# Patient Record
Sex: FEMALE | Race: WHITE | NOT HISPANIC OR LATINO | Employment: FULL TIME | RURAL
[De-identification: names, ages, dates, MRNs, and addresses within clinical notes are randomized per-mention and may not be internally consistent; named-entity substitution may affect disease eponyms.]

---

## 2017-06-03 ENCOUNTER — OFFICE VISIT (OUTPATIENT)
Dept: RETAIL CLINIC | Facility: CLINIC | Age: 37
End: 2017-06-03

## 2017-06-03 VITALS
HEART RATE: 99 BPM | TEMPERATURE: 97.9 F | RESPIRATION RATE: 20 BRPM | OXYGEN SATURATION: 100 % | BODY MASS INDEX: 28.04 KG/M2 | WEIGHT: 184.4 LBS

## 2017-06-03 DIAGNOSIS — J01.00 ACUTE MAXILLARY SINUSITIS, RECURRENCE NOT SPECIFIED: Primary | ICD-10-CM

## 2017-06-03 PROCEDURE — 99213 OFFICE O/P EST LOW 20 MIN: CPT | Performed by: NURSE PRACTITIONER

## 2017-06-03 RX ORDER — AMOXICILLIN 875 MG/1
875 TABLET, COATED ORAL 2 TIMES DAILY
Qty: 20 TABLET | Refills: 0 | Status: SHIPPED | OUTPATIENT
Start: 2017-06-03 | End: 2017-06-13

## 2017-06-03 RX ORDER — CETIRIZINE HYDROCHLORIDE 10 MG/1
10 TABLET ORAL DAILY
COMMUNITY
End: 2019-07-05

## 2017-06-03 NOTE — PATIENT INSTRUCTIONS

## 2017-06-03 NOTE — PROGRESS NOTES
Subjective   Marlyn Emmanuel is a 36 y.o. female.   Chief Complaint   Patient presents with   • Sinusitis      Sinusitis   The current episode started 1 to 4 weeks ago (2 weeks). There has been no fever. Associated symptoms include congestion (head), coughing (non-productive), headaches, sinus pressure and a sore throat (mild). Pertinent negatives include no chills or shortness of breath. Past treatments include acetaminophen. The treatment provided mild relief.            Marlyn presents to [facility] with cc of sinus pressure and drainage for 1-2 weeks and has taken Tylenol Sinus Congestion, which helps for a little while and then her headache returns.  Reviewed PMFSH.  See ROS.        The following portions of the patient's history were reviewed and updated as appropriate: allergies, current medications, past family history, past medical history, past social history, past surgical history and problem list.    Review of Systems   Constitutional: Negative for chills, fatigue and fever.   HENT: Positive for congestion (head), sinus pressure and sore throat (mild).    Respiratory: Positive for cough (non-productive). Negative for chest tightness and shortness of breath.    Cardiovascular: Negative for chest pain.   Endocrine: Negative for cold intolerance.   Skin: Negative for pallor and rash.   Neurological: Positive for headaches.     Pulse 99  Temp 97.9 °F (36.6 °C) (Temporal Artery )   Resp 20  Wt 184 lb 6.4 oz (83.6 kg)  LMP 04/12/2017 Comment: irregular periods  SpO2 100%  BMI 28.04 kg/m2    Objective     Current Outpatient Prescriptions:   •  BUPROPION HBR ER PO, Take  by mouth., Disp: , Rfl:   •  cetirizine (zyrTEC) 10 MG tablet, Take 10 mg by mouth Daily., Disp: , Rfl:   •  LEVOTHYROXINE SODIUM PO, Take  by mouth., Disp: , Rfl:   •  spironolactone (ALDACTONE) 25 MG tablet, Take 25 mg by mouth Daily., Disp: , Rfl:   •  amoxicillin (AMOXIL) 875 MG tablet, Take 1 tablet by mouth 2 (Two) Times a Day for 10  days., Disp: 20 tablet, Rfl: 0  •  guaiFENesin (MUCINEX) 600 MG 12 hr tablet, Take 1,200 mg by mouth 2 (Two) Times a Day., Disp: , Rfl:   •  neomycin-polymyxin-hydrocortisone (CORTISPORIN) 1 % solution otic solution, 4 (Four) Times a Day., Disp: , Rfl:   •  Norethindrone-Eth Estradiol (NORTREL 0.5/35, 28, PO), Take  by mouth., Disp: , Rfl:   •  Phenylephrine-Ibuprofen (ADVIL SINUS CONGESTION & PAIN)  MG tablet, Take  by mouth., Disp: , Rfl:   Allergies   Allergen Reactions   • Glucophage [Metformin] Swelling       Physical Exam   Constitutional: She is oriented to person, place, and time. She appears well-developed and well-nourished. No distress.   HENT:   Head: Normocephalic.   Right Ear: Tympanic membrane and external ear normal.   Left Ear: Tympanic membrane and external ear normal.   Nose: Mucosal edema present. Right sinus exhibits maxillary sinus tenderness and frontal sinus tenderness. Left sinus exhibits maxillary sinus tenderness and frontal sinus tenderness.   Mouth/Throat: Uvula is midline and mucous membranes are normal. Posterior oropharyngeal erythema present. Tonsils are 2+ on the right. Tonsils are 2+ on the left. No tonsillar exudate.   Eyes: Conjunctivae and EOM are normal. Pupils are equal, round, and reactive to light.   Neck: Normal range of motion. Neck supple.   Cardiovascular: Normal rate, regular rhythm and normal heart sounds.    Pulmonary/Chest: Effort normal and breath sounds normal. No respiratory distress.   Abdominal: Soft. Bowel sounds are normal. She exhibits no distension. There is no tenderness.   Musculoskeletal: Normal range of motion.   Lymphadenopathy:     She has no cervical adenopathy.   Neurological: She is alert and oriented to person, place, and time.   Skin: Skin is warm and dry. No rash noted.   Psychiatric: She has a normal mood and affect. Her behavior is normal. Judgment and thought content normal.   Nursing note and vitals reviewed.      Assessment/Plan    Marlyn was seen today for sinusitis.    Diagnoses and all orders for this visit:    Acute maxillary sinusitis, recurrence not specified  -     amoxicillin (AMOXIL) 875 MG tablet; Take 1 tablet by mouth 2 (Two) Times a Day for 10 days.

## 2017-08-25 ENCOUNTER — OFFICE VISIT (OUTPATIENT)
Dept: RETAIL CLINIC | Facility: CLINIC | Age: 37
End: 2017-08-25

## 2017-08-25 VITALS
WEIGHT: 177.8 LBS | BODY MASS INDEX: 27.03 KG/M2 | HEART RATE: 92 BPM | OXYGEN SATURATION: 99 % | TEMPERATURE: 97.1 F | RESPIRATION RATE: 20 BRPM

## 2017-08-25 DIAGNOSIS — R51.9 HEADACHE, UNSPECIFIED HEADACHE TYPE: ICD-10-CM

## 2017-08-25 DIAGNOSIS — R11.2 NON-INTRACTABLE VOMITING WITH NAUSEA, UNSPECIFIED VOMITING TYPE: Primary | ICD-10-CM

## 2017-08-25 PROCEDURE — 99213 OFFICE O/P EST LOW 20 MIN: CPT | Performed by: NURSE PRACTITIONER

## 2017-08-25 PROCEDURE — 96372 THER/PROPH/DIAG INJ SC/IM: CPT | Performed by: NURSE PRACTITIONER

## 2017-08-25 RX ORDER — PROMETHAZINE HYDROCHLORIDE 25 MG/ML
25 INJECTION, SOLUTION INTRAMUSCULAR; INTRAVENOUS ONCE
Status: COMPLETED | OUTPATIENT
Start: 2017-08-25 | End: 2017-08-25

## 2017-08-25 RX ADMIN — PROMETHAZINE HYDROCHLORIDE 25 MG: 25 INJECTION, SOLUTION INTRAMUSCULAR; INTRAVENOUS at 16:15

## 2017-08-25 NOTE — PROGRESS NOTES
Subjective   Marlyn Emmanuel is a 36 y.o. female.   Chief Complaint   Patient presents with   • Headache      Headache    This is a new problem. The current episode started today. The problem has been gradually worsening. The pain is located in the left unilateral and temporal region. The pain quality is similar to prior headaches. The quality of the pain is described as dull and stabbing. The pain is at a severity of 4/10. Associated symptoms include nausea, photophobia and vomiting (vomited on her way to the clinic). Pertinent negatives include no coughing, fever, rhinorrhea, sinus pressure or sore throat. The symptoms are aggravated by bright light and activity. She has tried nothing for the symptoms. (Has hx she says of headaches (migraines))      Marlyn presents to [facility] accompanied by her mother with cc of dull/stabbing headache today and says she had been a little nauseated and vomited on her way in to the clinic, she denies any fever/chilling/recent illness and describes her headache as left-sided and hurts when there is bright lights.  Reviewed PMFSH.  See ROS.          The following portions of the patient's history were reviewed and updated as appropriate: allergies, current medications, past family history, past medical history, past social history, past surgical history and problem list.    Review of Systems   Constitutional: Negative for chills and fever.   HENT: Negative for rhinorrhea, sinus pressure and sore throat.    Eyes: Positive for photophobia.   Respiratory: Negative for cough.    Gastrointestinal: Positive for nausea and vomiting (vomited on her way to the clinic).   Neurological: Positive for headaches.     Pulse 92  Temp 97.1 °F (36.2 °C) (Temporal Artery )   Resp 20  Wt 177 lb 12.8 oz (80.6 kg)  LMP 07/31/2017  SpO2 99%  BMI 27.03 kg/m2    Objective     Current Outpatient Prescriptions:   •  BUPROPION HBR ER PO, Take  by mouth., Disp: , Rfl:   •  cetirizine (zyrTEC) 10 MG tablet,  Take 10 mg by mouth Daily., Disp: , Rfl:   •  LEVOTHYROXINE SODIUM PO, Take  by mouth., Disp: , Rfl:   •  Norethindrone-Eth Estradiol (NORTREL 0.5/35, 28, PO), Take  by mouth., Disp: , Rfl:   •  spironolactone (ALDACTONE) 25 MG tablet, Take 25 mg by mouth Daily., Disp: , Rfl:   •  guaiFENesin (MUCINEX) 600 MG 12 hr tablet, Take 1,200 mg by mouth 2 (Two) Times a Day., Disp: , Rfl:   •  neomycin-polymyxin-hydrocortisone (CORTISPORIN) 1 % solution otic solution, 4 (Four) Times a Day., Disp: , Rfl:   •  Phenylephrine-Ibuprofen (ADVIL SINUS CONGESTION & PAIN)  MG tablet, Take  by mouth., Disp: , Rfl:   No current facility-administered medications for this visit.   Allergies   Allergen Reactions   • Glucophage [Metformin] Swelling       Physical Exam   Constitutional: She is oriented to person, place, and time. She appears well-developed and well-nourished. No distress.   HENT:   Head: Normocephalic and atraumatic.   Right Ear: External ear normal.   Left Ear: External ear normal.   Nose: Nose normal.   Mouth/Throat: Oropharynx is clear and moist.   Eyes: EOM are normal. Pupils are equal, round, and reactive to light. Right eye exhibits no discharge. Left eye exhibits no discharge.   Neck: Normal range of motion. Neck supple.   Cardiovascular: Normal rate, regular rhythm and normal heart sounds.    Pulmonary/Chest: Effort normal and breath sounds normal.   Abdominal: Soft. Bowel sounds are normal. She exhibits no distension. There is no tenderness. There is no rebound and no guarding.   Musculoskeletal: Normal range of motion.   Lymphadenopathy:     She has no cervical adenopathy.   Neurological: She is alert and oriented to person, place, and time. No cranial nerve deficit. Coordination normal.   Skin: Skin is warm and dry. No rash noted.   Psychiatric: She has a normal mood and affect. Her behavior is normal. Judgment and thought content normal.       Assessment/Plan   Marlyn was seen today for  headache.    Diagnoses and all orders for this visit:    Non-intractable vomiting with nausea, unspecified vomiting type  -     promethazine (PHENERGAN) injection 25 mg; Inject 1 mL into the shoulder, thigh, or buttocks 1 (One) Time.    Headache, unspecified headache type  -     promethazine (PHENERGAN) injection 25 mg; Inject 1 mL into the shoulder, thigh, or buttocks 1 (One) Time.

## 2017-08-25 NOTE — PATIENT INSTRUCTIONS
General Headache Without Cause  A headache is pain or discomfort felt around the head or neck area. The specific cause of a headache may not be found. There are many causes and types of headaches. A few common ones are:  · Tension headaches.  · Migraine headaches.  · Cluster headaches.  · Chronic daily headaches.  HOME CARE INSTRUCTIONS   Watch your condition for any changes. Take these steps to help with your condition:  Managing Pain  · Take over-the-counter and prescription medicines only as told by your health care provider.  · Lie down in a dark, quiet room when you have a headache.  · If directed, apply ice to the head and neck area:  ¨ Put ice in a plastic bag.  ¨ Place a towel between your skin and the bag.  ¨ Leave the ice on for 20 minutes, 2-3 times per day.  · Use a heating pad or hot shower to apply heat to the head and neck area as told by your health care provider.  · Keep lights dim if bright lights bother you or make your headaches worse.  Eating and Drinking  · Eat meals on a regular schedule.  · Limit alcohol use.  · Decrease the amount of caffeine you drink, or stop drinking caffeine.  General Instructions  · Keep all follow-up visits as told by your health care provider. This is important.  · Keep a headache journal to help find out what may trigger your headaches. For example, write down:    What you eat and drink.    How much sleep you get.    Any change to your diet or medicines.  · Try massage or other relaxation techniques.  · Limit stress.  · Sit up straight, and do not tense your muscles.  · Do not use tobacco products, including cigarettes, chewing tobacco, or e-cigarettes. If you need help quitting, ask your health care provider.  · Exercise regularly as told by your health care provider.  · Sleep on a regular schedule. Get 7-9 hours of sleep, or the amount recommended by your health care provider.  SEEK MEDICAL CARE IF:   · Your symptoms are not helped by medicine.  · You have a  headache that is different from the usual headache.  · You have nausea or you vomit.  · You have a fever.  SEEK IMMEDIATE MEDICAL CARE IF:   · Your headache becomes severe.  · You have repeated vomiting.  · You have a stiff neck.  · You have a loss of vision.  · You have problems with speech.  · You have pain in the eye or ear.  · You have muscular weakness or loss of muscle control.  · You lose your balance or have trouble walking.  · You feel faint or pass out.  · You have confusion.     This information is not intended to replace advice given to you by your health care provider. Make sure you discuss any questions you have with your health care provider.     Document Released: 12/18/2006 Document Revised: 09/07/2016 Document Reviewed: 04/11/2016  [employer] Interactive Patient Education ©2017 [employer]  Nausea and Vomiting, Adult  Nausea is the feeling that you have an upset stomach or have to vomit. As nausea gets worse, it can lead to vomiting. Vomiting occurs when stomach contents are thrown up and out of the mouth. Vomiting can make you feel weak and cause you to become dehydrated. Dehydration can make you tired and thirsty, cause you to have a dry mouth, and decrease how often you urinate. Older adults and people with other diseases or a weak immune system are at higher risk for dehydration. It is important to treat your nausea and vomiting as told by your health care provider.  HOME CARE INSTRUCTIONS  Follow instructions from your health care provider about how to care for yourself at home.  Eating and Drinking  Follow these recommendations as told by your health care provider:  · Take an oral rehydration solution (ORS). This is a drink that is sold at pharmacies and retail stores.  · Drink clear fluids in small amounts as you are able. Clear fluids include water, ice chips, diluted fruit juice, and low-calorie sports drinks.  · Eat bland, easy-to-digest foods in small amounts as you are able. These foods  include bananas, applesauce, rice, lean meats, toast, and crackers.  · Avoid fluids that contain a lot of sugar or caffeine, such as energy drinks, sports drinks, and soda.  · Avoid alcohol.  · Avoid spicy or fatty foods.  General Instructions  · Drink enough fluid to keep your urine clear or pale yellow.  · Wash your hands often. If soap and water are not available, use hand .  · Make sure that all people in your household wash their hands well and often.  · Take over-the-counter and prescription medicines only as told by your health care provider.  · Rest at home while you recover.  · Watch your condition for any changes.  · Breathe slowly and deeply when you feel nauseated.  · Keep all follow-up visits as told by your health care provider. This is important.  SEEK MEDICAL CARE IF:  · You have a fever.  · You cannot keep fluids down.  · Your symptoms get worse.  · You have new symptoms.  · Your nausea does not go away after two days.  · You feel light-headed or dizzy.  · You have a headache.  · You have muscle cramps.  SEEK IMMEDIATE MEDICAL CARE IF:  · You have pain in your chest, neck, arm, or jaw.  · You feel extremely weak or you faint.  · You have persistent vomiting.  · You see blood in your vomit.  · Your vomit looks like black coffee grounds.  · You have bloody or black stools or stools that look like tar.  · You have a severe headache, a stiff neck, or both.  · You have a rash.  · You have severe pain, cramping, or bloating in your abdomen.  · You have trouble breathing or you are breathing very quickly.  · Your heart is beating very quickly.  · Your skin feels cold and clammy.  · You feel confused.  · You have pain when you urinate.  · You have signs of dehydration, such as:    Dark urine, very little urine, or no urine.    Cracked lips.    Dry mouth.    Sunken eyes.    Sleepiness.    Weakness.  These symptoms may represent a serious problem that is an emergency. Do not wait to see if the  symptoms will go away. Get medical help right away. Call your local emergency services (911 in the [place] Do not drive yourself to the hospital.     This information is not intended to replace advice given to you by your health care provider. Make sure you discuss any questions you have with your health care provider.     Document Released: 12/18/2006 Document Revised: 04/10/2017 Document Reviewed: 08/23/2016  [employer] Interactive Patient Education ©2017 [employer]

## 2017-10-12 ENCOUNTER — TRANSCRIBE ORDERS (OUTPATIENT)
Dept: ADMINISTRATIVE | Facility: HOSPITAL | Age: 37
End: 2017-10-12

## 2017-10-12 DIAGNOSIS — M54.12 RADICULOPATHY, CERVICAL REGION: Primary | ICD-10-CM

## 2017-10-16 ENCOUNTER — HOSPITAL ENCOUNTER (OUTPATIENT)
Dept: MRI IMAGING | Facility: HOSPITAL | Age: 37
Discharge: HOME | End: 2017-10-16
Admitting: PHYSICIAN ASSISTANT

## 2017-10-16 DIAGNOSIS — M54.12 RADICULOPATHY, CERVICAL REGION: ICD-10-CM

## 2017-10-16 PROCEDURE — 72141 MRI NECK SPINE W/O DYE: CPT

## 2017-10-16 PROCEDURE — 72141 MRI NECK SPINE W/O DYE: CPT | Performed by: RADIOLOGY

## 2018-01-26 ENCOUNTER — TRANSCRIBE ORDERS (OUTPATIENT)
Dept: ADMINISTRATIVE | Facility: HOSPITAL | Age: 38
End: 2018-01-26

## 2018-01-26 DIAGNOSIS — Z98.890 HISTORY OF BILATERAL BREAST REDUCTION SURGERY: Primary | ICD-10-CM

## 2018-02-20 ENCOUNTER — APPOINTMENT (OUTPATIENT)
Dept: MAMMOGRAPHY | Facility: HOSPITAL | Age: 38
End: 2018-02-20
Attending: OBSTETRICS & GYNECOLOGY

## 2018-02-27 ENCOUNTER — HOSPITAL ENCOUNTER (OUTPATIENT)
Dept: MAMMOGRAPHY | Facility: HOSPITAL | Age: 38
Discharge: HOME | End: 2018-02-27
Attending: OBSTETRICS & GYNECOLOGY | Admitting: OBSTETRICS & GYNECOLOGY

## 2018-02-27 DIAGNOSIS — Z98.890 HISTORY OF BILATERAL BREAST REDUCTION SURGERY: ICD-10-CM

## 2018-02-27 PROCEDURE — 77066 DX MAMMO INCL CAD BI: CPT | Performed by: RADIOLOGY

## 2018-02-27 PROCEDURE — 77066 DX MAMMO INCL CAD BI: CPT

## 2018-02-27 PROCEDURE — G0279 TOMOSYNTHESIS, MAMMO: HCPCS

## 2018-02-27 PROCEDURE — 77062 BREAST TOMOSYNTHESIS BI: CPT | Performed by: RADIOLOGY

## 2019-03-15 ENCOUNTER — OFFICE VISIT (OUTPATIENT)
Dept: UROLOGY | Facility: CLINIC | Age: 39
End: 2019-03-15

## 2019-03-15 ENCOUNTER — HOSPITAL ENCOUNTER (OUTPATIENT)
Dept: GENERAL RADIOLOGY | Facility: HOSPITAL | Age: 39
Discharge: HOME | End: 2019-03-15
Admitting: UROLOGY

## 2019-03-15 VITALS — HEIGHT: 68 IN | BODY MASS INDEX: 27.28 KG/M2 | WEIGHT: 180 LBS

## 2019-03-15 DIAGNOSIS — N20.0 KIDNEY STONE: ICD-10-CM

## 2019-03-15 DIAGNOSIS — N20.0 KIDNEY STONE: Primary | ICD-10-CM

## 2019-03-15 PROCEDURE — 74018 RADEX ABDOMEN 1 VIEW: CPT | Performed by: RADIOLOGY

## 2019-03-15 PROCEDURE — 74018 RADEX ABDOMEN 1 VIEW: CPT

## 2019-03-15 PROCEDURE — 99203 OFFICE O/P NEW LOW 30 MIN: CPT | Performed by: UROLOGY

## 2019-03-15 RX ORDER — MONTELUKAST SODIUM 10 MG/1
10 TABLET ORAL NIGHTLY
COMMUNITY
End: 2022-12-13 | Stop reason: HOSPADM

## 2019-03-15 RX ORDER — OMEPRAZOLE 40 MG/1
40 CAPSULE, DELAYED RELEASE ORAL DAILY
COMMUNITY

## 2019-03-15 NOTE — PROGRESS NOTES
Answers for HPI/ROS submitted by the patient on 3/14/2019   Abdominal pain  Chronicity: recurrent  Onset: 1 to 4 weeks ago  Onset quality: sudden  Frequency: 2 to 4 times per day  Episode duration: 1 hours  Progression since onset: unchanged  Pain location: LLQ, RLQ, left flank, right flank  Pain - numeric: 6/10  Pain quality: dull, sharp  Radiates to: back, pelvis  anorexia: No  belching: Yes  Aggravated by: nothing  Relieved by: nothing  Diagnostic workup: CT scan, upper endoscopy  Chief Complaint:          Chief Complaint   Patient presents with   • Nephrolithiasis     Er follow up       HPI:   38 y.o. female.  38-year-old white female is referred with a stone she was seen in the emergency room 3 weeks ago.  She had pain in the right side.  Her KUB was obtained was negative.  10 years ago she had lithotripsy.  I believe she is passed her stone I gave her reassurance I will see her back on an as-needed basis    Past Medical History:        Past Medical History:   Diagnosis Date   • Anxiety    • Asthma    • Disease of thyroid gland    • Kidney stone    • PCOS (polycystic ovarian syndrome)    • PCOS (polycystic ovarian syndrome)          Current Meds:     Current Outpatient Medications   Medication Sig Dispense Refill   • ALLERGY SERUM INJECTION Inject  under the skin into the appropriate area as directed 1 (One) Time.     • LEVOTHYROXINE SODIUM PO Take 125 mcg by mouth Daily.     • montelukast (SINGULAIR) 10 MG tablet Take 10 mg by mouth Every Night.     • Multiple Vitamins-Minerals (MULTIVITAMIN ADULT PO) Take 1 tablet by mouth Daily.     • omeprazole (priLOSEC) 40 MG capsule Take 40 mg by mouth Daily.     • Semaglutide (OZEMPIC) 1 MG/DOSE solution pen-injector Inject 1 dose as directed 1 (One) Time Per Week.     • spironolactone (ALDACTONE) 25 MG tablet Take 50 mg by mouth Daily.     • BUPROPION HBR ER PO Take  by mouth.     • cetirizine (zyrTEC) 10 MG tablet Take 10 mg by mouth Daily.     • guaiFENesin (MUCINEX)  600 MG 12 hr tablet Take 1,200 mg by mouth 2 (Two) Times a Day.     • neomycin-polymyxin-hydrocortisone (CORTISPORIN) 1 % solution otic solution 4 (Four) Times a Day.     • Norethindrone-Eth Estradiol (NORTREL 0.5/35, 28, PO) Take  by mouth.     • Phenylephrine-Ibuprofen (ADVIL SINUS CONGESTION & PAIN)  MG tablet Take  by mouth.       No current facility-administered medications for this visit.         Allergies:      Allergies   Allergen Reactions   • Glucophage [Metformin] Swelling        Past Surgical History:     Past Surgical History:   Procedure Laterality Date   • BILATERAL BREAST REDUCTION     • REDUCTION MAMMAPLASTY     • VAGINAL DELIVERY  ;     2 daughters         Social History:     Social History     Socioeconomic History   • Marital status:      Spouse name: Not on file   • Number of children: Not on file   • Years of education: Not on file   • Highest education level: Not on file   Social Needs   • Financial resource strain: Not on file   • Food insecurity - worry: Not on file   • Food insecurity - inability: Not on file   • Transportation needs - medical: Not on file   • Transportation needs - non-medical: Not on file   Occupational History   • Not on file   Tobacco Use   • Smoking status: Former Smoker     Packs/day: 1.00     Years: 2.00     Pack years: 2.00     Types: Cigarettes     Last attempt to quit: 2006     Years since quittin.2   • Smokeless tobacco: Never Used   Substance and Sexual Activity   • Alcohol use: No   • Drug use: No   • Sexual activity: Yes     Birth control/protection: Pill, OCP     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  Negative for activity change, appetite change, chills, diaphoresis, fatigue and unexpected weight change.   HENT: Negative for congestion, dental problem, drooling, ear discharge, ear pain, facial swelling, hearing loss, mouth sores, nosebleeds, postnasal drip, rhinorrhea, sinus pressure, sneezing, sore throat, tinnitus, trouble swallowing and voice change.    Eyes: Negative.  Negative for photophobia, pain, discharge, redness, itching and visual disturbance.   Respiratory: Negative.  Negative for apnea, cough, choking, chest tightness, shortness of breath, wheezing and stridor.    Cardiovascular: Negative.  Negative for chest pain, palpitations and leg swelling.   Gastrointestinal: Positive for diarrhea and nausea. Negative for abdominal distention, abdominal pain, anal bleeding, blood in stool, constipation, rectal pain and vomiting.   Endocrine: Negative.  Negative for cold intolerance, heat intolerance, polydipsia, polyphagia and polyuria.   Genitourinary: Positive for hematuria.   Musculoskeletal: Positive for arthralgias and myalgias. Negative for back pain, gait problem, joint swelling, neck pain and neck stiffness.   Skin: Negative.  Negative for color change, pallor, rash and wound.   Allergic/Immunologic: Negative.  Negative for environmental allergies, food allergies and immunocompromised state.   Neurological: Negative.  Negative for dizziness, tremors, seizures, syncope, facial asymmetry, speech difficulty, weakness, light-headedness, numbness and headaches.   Hematological: Negative.  Negative for adenopathy. Does not bruise/bleed easily.   Psychiatric/Behavioral: Negative for agitation, behavioral problems, confusion, decreased concentration, dysphoric mood, hallucinations, self-injury, sleep disturbance and suicidal ideas. The patient is not nervous/anxious and is not  hyperactive.    All other systems reviewed and are negative.      Physical Exam:     Physical Exam   Constitutional: She appears well-developed and well-nourished.   HENT:   Head: Normocephalic and atraumatic.   Right Ear: External ear normal.   Left Ear: External ear normal.   Mouth/Throat: Oropharynx is clear and moist.   Eyes: Conjunctivae are normal. Pupils are equal, round, and reactive to light.   Cardiovascular: Normal rate, regular rhythm, normal heart sounds and intact distal pulses.   Pulmonary/Chest: Effort normal and breath sounds normal.   Abdominal: Soft. Bowel sounds are normal. She exhibits no distension and no mass. There is no tenderness. There is no rebound and no guarding.   Genitourinary: No vaginal discharge found.   Musculoskeletal: Normal range of motion.   Neurological: She is alert. She has normal reflexes.   Skin: Skin is warm and dry.   Psychiatric: She has a normal mood and affect. Her behavior is normal. Judgment and thought content normal.       I have reviewed the following portions of the patient's history: allergies, current medications, past family history, past medical history, past social history, past surgical history, problem list and ROS and confirm it's accurate.      Procedure:       Assessment/Plan:   Renal calculus-we discussed the presence of the stone we discussed the various therapeutic options available including percutaneous nephrostolithotomy, lithotripsy.  We discussed the risks of lithotripsy including the passage of stones the development of a large string of stones in the distal ureter known as Steinstrasse.  In the 3% incidence of that we will need to proceed with a ureteroscopy for obstructing fragments.  Extremely rare incidence of renal hematoma.  And the significance of this.  We discussed the absolute relative indicators for intervention including the presence of sepsis, and pain we cannot control is the primary need for urgent intervention.  We discussed  placement of a stent if indicated and the management of the stent as well.  His KUB was negative and she has likely passed a stone I gave her reassurance    Patient's Body mass index is 27.37 kg/m². BMI is above normal parameters. Recommendations include: educational material.          This document has been electronically signed by DION DODD MD March 15, 2019 8:21 AM

## 2019-03-18 PROBLEM — N20.0 KIDNEY STONE: Status: ACTIVE | Noted: 2019-03-18

## 2019-04-25 ENCOUNTER — OFFICE VISIT (OUTPATIENT)
Dept: CARDIOLOGY | Facility: CLINIC | Age: 39
End: 2019-04-25

## 2019-04-25 VITALS
SYSTOLIC BLOOD PRESSURE: 111 MMHG | HEIGHT: 68 IN | WEIGHT: 183 LBS | HEART RATE: 91 BPM | DIASTOLIC BLOOD PRESSURE: 72 MMHG | BODY MASS INDEX: 27.74 KG/M2

## 2019-04-25 DIAGNOSIS — R00.2 PALPITATIONS: Primary | ICD-10-CM

## 2019-04-25 PROCEDURE — 93000 ELECTROCARDIOGRAM COMPLETE: CPT | Performed by: INTERNAL MEDICINE

## 2019-04-25 PROCEDURE — 99204 OFFICE O/P NEW MOD 45 MIN: CPT | Performed by: INTERNAL MEDICINE

## 2019-04-25 NOTE — PROGRESS NOTES
Sage Quevedo PA-C  Marlyn Emmanuel     1980  04/25/2019    Patient Active Problem List   Diagnosis   • Kidney stone       Dear Sage:    Subjective     Marlyn Emmanuel is a 38 y.o. female with the problems as listed above, presents    Chief complaint: Palpitation with rapid heartbeat.    History of Present Illness: Ms. Beard is a pleasant 38-year-old  female with no history of known heart disease but evidently has history of some cardiac arrhythmias as a child, presents with complains of having recurrent palpitations over the last 5 to 6 months.  She states she has these episodes at random with no relation to exertion or any unusual stress or anxiety.  She indicates that her heart rate sometimes goes up to 160.  There is no associated dizziness or syncope but she does get dizzy when she bends down.  She denies any chest pain but some times does get short of breath.  She has no documented cardiac arrhythmias or structural heart disease.  She denies drinking much of caffeinated beverages.   She smoked about a half to pack a day for about 4 years but quit..      Allergies   Allergen Reactions   • Glucophage [Metformin] Swelling   :    Current Outpatient Medications:   •  ALLERGY SERUM INJECTION, Inject  under the skin into the appropriate area as directed 1 (One) Time., Disp: , Rfl:   •  BUPROPION HBR ER PO, Take  by mouth., Disp: , Rfl:   •  cetirizine (zyrTEC) 10 MG tablet, Take 10 mg by mouth Daily., Disp: , Rfl:   •  guaiFENesin (MUCINEX) 600 MG 12 hr tablet, Take 1,200 mg by mouth 2 (Two) Times a Day., Disp: , Rfl:   •  LEVOTHYROXINE SODIUM PO, Take 125 mcg by mouth Daily., Disp: , Rfl:   •  montelukast (SINGULAIR) 10 MG tablet, Take 10 mg by mouth Every Night., Disp: , Rfl:   •  Multiple Vitamins-Minerals (MULTIVITAMIN ADULT PO), Take 1 tablet by mouth Daily., Disp: , Rfl:   •  neomycin-polymyxin-hydrocortisone (CORTISPORIN) 1 % solution otic solution, 4 (Four) Times a Day., Disp: , Rfl:   •   Norethindrone-Eth Estradiol (NORTREL 0.5/35, 28, PO), Take  by mouth., Disp: , Rfl:   •  omeprazole (priLOSEC) 40 MG capsule, Take 40 mg by mouth Daily., Disp: , Rfl:   •  Phenylephrine-Ibuprofen (ADVIL SINUS CONGESTION & PAIN)  MG tablet, Take  by mouth., Disp: , Rfl:   •  Semaglutide (OZEMPIC) 1 MG/DOSE solution pen-injector, Inject 1 dose as directed 1 (One) Time Per Week., Disp: , Rfl:   •  spironolactone (ALDACTONE) 25 MG tablet, Take 50 mg by mouth Daily., Disp: , Rfl:   •  metoprolol tartrate (LOPRESSOR) 25 MG tablet, Take 1 tablet by mouth 2 (Two) Times a Day., Disp: 60 tablet, Rfl: 3    Past Medical History:   Diagnosis Date   • Anxiety    • Asthma    • Disease of thyroid gland    • Kidney stone    • PCOS (polycystic ovarian syndrome)    • PCOS (polycystic ovarian syndrome)      Past Surgical History:   Procedure Laterality Date   • BILATERAL BREAST REDUCTION     • REDUCTION MAMMAPLASTY     • VAGINAL DELIVERY  ;     2 daughters     Family History   Problem Relation Age of Onset   • Hypertension Mother    • No Known Problems Father    • Hypertension Sister    • Cancer Maternal Grandmother    • Cancer Maternal Grandfather    • Thyroid disease Paternal Grandmother    • Breast cancer Maternal Aunt      Social History     Tobacco Use   • Smoking status: Former Smoker     Packs/day: 1.00     Years: 2.00     Pack years: 2.00     Types: Cigarettes     Last attempt to quit:      Years since quittin.3   • Smokeless tobacco: Never Used   Substance Use Topics   • Alcohol use: No   • Drug use: No       Review of Systems   Constitution: Negative for chills, diaphoresis and fever.   HENT: Negative for congestion.    Eyes: Negative for blurred vision and double vision.   Cardiovascular: Positive for chest pain, leg swelling, palpitations and syncope. Negative for orthopnea and paroxysmal nocturnal dyspnea.   Respiratory: Positive for shortness of breath. Negative for cough, hemoptysis and  "wheezing.    Endocrine: Negative for cold intolerance and heat intolerance.   Hematologic/Lymphatic: Does not bruise/bleed easily.   Skin: Negative.  Negative for rash.   Musculoskeletal: Negative.  Negative for myalgias.   Gastrointestinal: Negative for abdominal pain, constipation, diarrhea, nausea and vomiting.   Genitourinary: Negative for dysuria and hematuria.   Neurological: Negative for dizziness, focal weakness and numbness.   Psychiatric/Behavioral: Negative.    Allergic/Immunologic: Negative.        Objective   Blood pressure 111/72, pulse 91, height 172.7 cm (67.99\"), weight 83 kg (183 lb).  Body mass index is 27.83 kg/m².        Physical Exam   Constitutional: She is oriented to person, place, and time. She appears well-developed and well-nourished.   HENT:   Mouth/Throat: Oropharynx is clear and moist.   Eyes: EOM are normal. Pupils are equal, round, and reactive to light.   Neck: Neck supple. No JVD present. No tracheal deviation present. No thyromegaly present.   Cardiovascular: Normal rate, regular rhythm, S1 normal and S2 normal. Exam reveals no gallop, no S3 and no friction rub.   No murmur heard.  Pulmonary/Chest: Effort normal and breath sounds normal.   Abdominal: Soft. Bowel sounds are normal. She exhibits no mass. There is no tenderness.   Musculoskeletal: Normal range of motion. She exhibits no edema.   Lymphadenopathy:     She has no cervical adenopathy.   Neurological: She is alert and oriented to person, place, and time.   Skin: Skin is warm and dry. No rash noted.   Psychiatric: She has a normal mood and affect.       Lab Results   Component Value Date     03/15/2016    K 4.2 03/15/2016     03/15/2016    CO2 22.3 (L) 03/15/2016    BUN 12 03/15/2016    CREATININE 0.78 03/15/2016    GLUCOSE 89 03/15/2016    CALCIUM 9.1 03/15/2016    AST 21 03/15/2016    ALT 23 03/15/2016    ALKPHOS 42 03/15/2016    LABIL2 1.5 03/15/2016     No results found for: CKTOTAL  Lab Results   Component " Value Date    WBC 8.5 03/15/2016    HGB 12.5 03/15/2016    HCT 38.1 03/15/2016     03/15/2016         ECG 12 Lead  Date/Time: 4/25/2019 1:21 PM  Performed by: Shawn Harrington MD  Authorized by: Shawn Harrington MD   Comparison: compared with previous ECG from 3/5/2016  Similar to previous ECG  Rhythm: sinus rhythm  BPM: 88  Conduction: conduction normal  ST Segments: ST segments normal  T Waves: T waves normal  Other: no other findings    Clinical impression: normal ECG  Comments: QTC: 396 ms.                Assessment/Plan    Diagnosis Plan   1. Palpitations  Cardiac Event Monitor     Recommendations:    Orders Placed This Encounter   Procedures   • Cardiac Event Monitor   • ECG 12 Lead        1. We will arrange an event monitor.  2. We will try metoprolol 25 mg p.o. twice daily.  3. I told her to watch for any symptoms of dizziness or lightheadedness and cut back on the dose to half a tablet twice a day if needed.    Return in about 6 weeks (around 6/6/2019).    As always, I appreciate very much the opportunity to participate in the cardiovascular care of your patients.      With Best Regards,    Shawn Harrington MD, [facility]    Dragon disclaimer:  Much of this encounter note is an electronic transcription/translation of spoken language to printed text. The electronic translation of spoken language may permit erroneous, or at times, nonsensical words or phrases to be inadvertently transcribed; Although I have reviewed the note for such errors, some may still exist.

## 2019-06-12 ENCOUNTER — OFFICE VISIT (OUTPATIENT)
Dept: CARDIOLOGY | Facility: CLINIC | Age: 39
End: 2019-06-12

## 2019-06-12 VITALS
SYSTOLIC BLOOD PRESSURE: 105 MMHG | DIASTOLIC BLOOD PRESSURE: 76 MMHG | OXYGEN SATURATION: 99 % | HEART RATE: 83 BPM | HEIGHT: 68 IN | BODY MASS INDEX: 27.58 KG/M2 | WEIGHT: 182 LBS

## 2019-06-12 DIAGNOSIS — R00.2 PALPITATIONS: Primary | ICD-10-CM

## 2019-06-12 PROCEDURE — 99213 OFFICE O/P EST LOW 20 MIN: CPT | Performed by: INTERNAL MEDICINE

## 2019-06-12 RX ORDER — PROPRANOLOL HYDROCHLORIDE 10 MG/1
10 TABLET ORAL 3 TIMES DAILY
Qty: 90 TABLET | Refills: 2 | Status: SHIPPED | OUTPATIENT
Start: 2019-06-12 | End: 2019-09-12 | Stop reason: SDUPTHER

## 2019-06-12 NOTE — PROGRESS NOTES
Sage Quevedo PA-C  Marlyn Emmanuel  1980  06/12/2019    Patient Active Problem List   Diagnosis   • Kidney stone   • Palpitations       Dear Sage:    Subjective     Marlyn Emmanuel is a 38 y.o. female with the problems as listed above, presents    Chief Complaint   Patient presents with   • Results     event monitor      History of Present Illness: Ms. Emmanuel is a pleasant 38-year-old  female with complaints of palpitations for which she was recently evaluated with an event monitor and she is here today to review the monitor results.  She has had several episodes of dizziness and lightheadedness during the monitoring that corresponded to sinus rhythm and sinus tachycardia with heart rates up to maximum of about 120 bpm.  There were no significant arrhythmias documented during the monitoring.  There were no bradycardia arrhythmias noted either.  She states that her palpitations are somewhat better since she has been on metoprolol.  She denies drinking any caffeinated beverages.  She denies any problems with anxiety.  She has no history of known hyper thyroidism.  She however is on levothyroxine for hypothyroidism.      Allergies   Allergen Reactions   • Glucophage [Metformin] Swelling   :      Current Outpatient Medications:   •  ALLERGY SERUM INJECTION, Inject  under the skin into the appropriate area as directed 1 (One) Time., Disp: , Rfl:   •  BUPROPION HBR ER PO, Take  by mouth., Disp: , Rfl:   •  cetirizine (zyrTEC) 10 MG tablet, Take 10 mg by mouth Daily., Disp: , Rfl:   •  guaiFENesin (MUCINEX) 600 MG 12 hr tablet, Take 1,200 mg by mouth 2 (Two) Times a Day., Disp: , Rfl:   •  LEVOTHYROXINE SODIUM PO, Take 125 mcg by mouth Daily., Disp: , Rfl:   •  montelukast (SINGULAIR) 10 MG tablet, Take 10 mg by mouth Every Night., Disp: , Rfl:   •  Multiple Vitamins-Minerals (MULTIVITAMIN ADULT PO), Take 1 tablet by mouth Daily., Disp: , Rfl:   •  neomycin-polymyxin-hydrocortisone (CORTISPORIN) 1 %  "solution otic solution, 4 (Four) Times a Day., Disp: , Rfl:   •  Norethindrone-Eth Estradiol (NORTREL 0.5/35, 28, PO), Take  by mouth., Disp: , Rfl:   •  omeprazole (priLOSEC) 40 MG capsule, Take 40 mg by mouth Daily., Disp: , Rfl:   •  Phenylephrine-Ibuprofen (ADVIL SINUS CONGESTION & PAIN)  MG tablet, Take  by mouth., Disp: , Rfl:   •  Semaglutide (OZEMPIC) 1 MG/DOSE solution pen-injector, Inject 1 dose as directed 1 (One) Time Per Week., Disp: , Rfl:   •  spironolactone (ALDACTONE) 25 MG tablet, Take 50 mg by mouth Daily., Disp: , Rfl:   •  propranolol (INDERAL) 10 MG tablet, Take 1 tablet by mouth 3 (Three) Times a Day., Disp: 90 tablet, Rfl: 2      The following portions of the patient's history were reviewed and updated as appropriate: allergies, current medications, past family history, past medical history, past social history, past surgical history and problem list.    Social History     Tobacco Use   • Smoking status: Former Smoker     Packs/day: 1.00     Years: 2.00     Pack years: 2.00     Types: Cigarettes     Last attempt to quit: 2006     Years since quittin.4   • Smokeless tobacco: Never Used   Substance Use Topics   • Alcohol use: No   • Drug use: No       Review of Systems   Constitution: Negative for chills and fever.   HENT: Negative for nosebleeds and sore throat.    Cardiovascular: Positive for palpitations.   Respiratory: Negative for cough, hemoptysis and wheezing.    Gastrointestinal: Negative for abdominal pain, hematemesis, hematochezia, melena, nausea and vomiting.   Genitourinary: Negative for dysuria and hematuria.   Neurological: Positive for dizziness and headaches.       Objective   Vitals:    19 1305   BP: 105/76   BP Location: Left arm   Patient Position: Sitting   Cuff Size: Adult   Pulse: 83   SpO2: 99%   Weight: 82.6 kg (182 lb)   Height: 172.7 cm (67.99\")     Body mass index is 27.68 kg/m².    Physical Exam   Constitutional: She is oriented to person, place, " and time. She appears well-developed and well-nourished.   HENT:   Head: Normocephalic.   Eyes: Conjunctivae and EOM are normal.   Neck: Normal range of motion. Neck supple. No JVD present. No tracheal deviation present. No thyromegaly present.   Cardiovascular: Normal rate and regular rhythm. Exam reveals no gallop, no S3, no S4 and no friction rub.   No murmur heard.  Pulmonary/Chest: Breath sounds normal. No respiratory distress. She has no wheezes. She has no rales.   Abdominal: Soft. Bowel sounds are normal. She exhibits no mass. There is no tenderness.   Musculoskeletal: She exhibits no edema.   Neurological: She is alert and oriented to person, place, and time. No cranial nerve deficit.   Skin: Skin is warm and dry.   Psychiatric: She has a normal mood and affect.       Lab Results   Component Value Date     03/15/2016    K 4.2 03/15/2016     03/15/2016    CO2 22.3 (L) 03/15/2016    BUN 12 03/15/2016    CREATININE 0.78 03/15/2016    GLUCOSE 89 03/15/2016    CALCIUM 9.1 03/15/2016    AST 21 03/15/2016    ALT 23 03/15/2016    ALKPHOS 42 03/15/2016    LABIL2 1.5 03/15/2016     No results found for: CKTOTAL  Lab Results   Component Value Date    WBC 8.5 03/15/2016    HGB 12.5 03/15/2016    HCT 38.1 03/15/2016     03/15/2016         Procedures      Assessment/Plan :   Diagnosis Plan   1. Palpitations          Recommendations:  1. I have reviewed the event monitor results with the patient.  2. We will change the metoprolol to propranolol 10 mg p.o. 3 times daily and see if this would help with her palpitations better.    Return in about 3 months (around 9/12/2019).    As always, Sage  I appreciate very much the opportunity to participate in the cardiovascular care of your patients. Please do not hesitate to call me with any questions with regards to Marlyn Emmanuel evaluation and management.       With Best Regards,        Shawn Harrington MD, [facility]    Dragon disclaimer:  Much of this encounter  note is an electronic transcription/translation of spoken language to printed text. The electronic translation of spoken language may permit erroneous, or at times, nonsensical words or phrases to be inadvertently transcribed; Although I have reviewed the note for such errors, some may still exist.

## 2019-07-05 ENCOUNTER — OFFICE VISIT (OUTPATIENT)
Dept: RETAIL CLINIC | Facility: CLINIC | Age: 39
End: 2019-07-05

## 2019-07-05 VITALS
WEIGHT: 178 LBS | SYSTOLIC BLOOD PRESSURE: 103 MMHG | BODY MASS INDEX: 27.07 KG/M2 | TEMPERATURE: 98.4 F | OXYGEN SATURATION: 99 % | DIASTOLIC BLOOD PRESSURE: 83 MMHG | RESPIRATION RATE: 20 BRPM | HEART RATE: 109 BPM

## 2019-07-05 DIAGNOSIS — R60.0 FACIAL EDEMA: Primary | ICD-10-CM

## 2019-07-05 PROCEDURE — 96372 THER/PROPH/DIAG INJ SC/IM: CPT | Performed by: NURSE PRACTITIONER

## 2019-07-05 PROCEDURE — 99213 OFFICE O/P EST LOW 20 MIN: CPT | Performed by: NURSE PRACTITIONER

## 2019-07-05 RX ORDER — PREDNISONE 10 MG/1
TABLET ORAL
Qty: 21 TABLET | Refills: 0 | Status: SHIPPED | OUTPATIENT
Start: 2019-07-06 | End: 2019-09-12

## 2019-07-05 RX ORDER — SPIRONOLACTONE 100 MG/1
TABLET, FILM COATED ORAL
Refills: 0 | COMMUNITY
Start: 2019-06-10 | End: 2022-12-13 | Stop reason: HOSPADM

## 2019-07-05 RX ORDER — DIPHENHYDRAMINE HCL 25 MG
25 CAPSULE ORAL EVERY 6 HOURS PRN
COMMUNITY
End: 2019-09-12

## 2019-07-05 RX ORDER — METHYLPREDNISOLONE ACETATE 80 MG/ML
80 INJECTION, SUSPENSION INTRA-ARTICULAR; INTRALESIONAL; INTRAMUSCULAR; SOFT TISSUE ONCE
Status: COMPLETED | OUTPATIENT
Start: 2019-07-05 | End: 2019-07-05

## 2019-07-05 RX ADMIN — METHYLPREDNISOLONE ACETATE 80 MG: 80 INJECTION, SUSPENSION INTRA-ARTICULAR; INTRALESIONAL; INTRAMUSCULAR; SOFT TISSUE at 12:00

## 2019-07-05 NOTE — PATIENT INSTRUCTIONS
Angioedema  Angioedema is sudden swelling in the body. The swelling can happen in any part of the body. It often happens on the skin and causes itchy, bumpy patches (hives) to form.  This condition may:  · Happen only one time.  · Happen more than one time. It may come back at random times.  · Keep coming back for a number of years. Someday it may stop coming back.    Follow these instructions at home:  · Take over-the-counter and prescription medicines only as told by your doctor.  · If you were given medicines for emergency allergy treatment, always carry them with you.  · Wear a medical bracelet as told by your doctor.  · Avoid the things that cause your attacks (triggers).  · If this condition was passed to you from your parents and you want to have kids, talk to your doctor. Your kids may also have this condition.  Contact a doctor if:  · You have another attack.  · Your attacks happen more often, even after you take steps to prevent them.  · This condition was passed to you by your parents and you want to have kids.  Get help right away if:  · Your mouth, tongue, or lips get very swollen.  · You have trouble breathing.  · You have trouble swallowing.  · You pass out (faint).  This information is not intended to replace advice given to you by your health care provider. Make sure you discuss any questions you have with your health care provider.  Document Released: 12/06/2010 Document Revised: 07/19/2017 Document Reviewed: 06/27/2017  [employer] Patient Education © 2019 [employer]

## 2019-07-05 NOTE — PROGRESS NOTES
Subjective   Malryn Emmanuel is a 38 y.o. female.   Chief Complaint   Patient presents with   • Allergies      Allergies   This is a recurrent problem. The current episode started yesterday. Episode frequency: Pt states that she had similar episode of facial edema and itching approx one month ago. Was successfully treated with steroid shot and pred bandar. Currently on allergy shots. Has taken two doses of benadryl prior to presenting at the clinic. The problem has been unchanged. Pertinent negatives include no congestion. Associated symptoms comments: No oral/tongue swelling, no difficulty swallowing or breathing, denies wheezing. Treatments tried: benadryl. The treatment provided no relief.        The following portions of the patient's history were reviewed and updated as appropriate: allergies, current medications, past family history, past medical history, past social history, past surgical history and problem list.    Review of Systems   Constitutional: Negative.    HENT: Positive for facial swelling. Negative for congestion and trouble swallowing.    Eyes: Negative.         Eye lid edema and puffiness   Respiratory: Negative.  Negative for chest tightness, shortness of breath, wheezing and stridor.    Gastrointestinal: Negative.    Genitourinary: Negative.    Skin: Negative.    Allergic/Immunologic: Positive for environmental allergies and immunocompromised state.   Hematological: Negative for adenopathy.   Psychiatric/Behavioral: Negative.    All other systems reviewed and are negative.      Objective   Allergies   Allergen Reactions   • Glucophage [Metformin] Swelling       Physical Exam   Constitutional: She is oriented to person, place, and time. She appears well-developed and well-nourished.   HENT:   Head: Head is with right periorbital erythema and with left periorbital erythema.   Nose: Nose normal.   Mouth/Throat: Oropharynx is clear and moist. No uvula swelling. No posterior oropharyngeal edema.    Facial swelling and erythema, no vesicles or scales   Eyes: Pupils are equal, round, and reactive to light. Right eye exhibits no discharge. Left eye exhibits no discharge.   Neck: Neck supple.   Cardiovascular: Normal rate and regular rhythm.   Pulmonary/Chest: Effort normal and breath sounds normal. No respiratory distress. She has no wheezes.   Abdominal: Soft. Bowel sounds are normal. There is no tenderness. There is no rigidity, no rebound and no guarding.   Musculoskeletal: Normal range of motion.   Lymphadenopathy:     She has no cervical adenopathy.   Neurological: She is alert and oriented to person, place, and time.   Skin: Skin is warm and dry. Rash noted. Rash is urticarial.        Psychiatric: She has a normal mood and affect.   Vitals reviewed.      Assessment/Plan   Marlyn was seen today for allergies.    Diagnoses and all orders for this visit:    Facial edema  -     methylPREDNISolone acetate (DEPO-medrol) injection 80 mg    Other orders  -     predniSONE (DELTASONE) 10 MG tablet; Take as directed per 6 day pred bandar                 This document has been electronically signed by ADDIS Celeste July 5, 2019 12:37 PM

## 2019-09-12 ENCOUNTER — OFFICE VISIT (OUTPATIENT)
Dept: CARDIOLOGY | Facility: CLINIC | Age: 39
End: 2019-09-12

## 2019-09-12 VITALS
HEART RATE: 104 BPM | SYSTOLIC BLOOD PRESSURE: 111 MMHG | OXYGEN SATURATION: 99 % | HEIGHT: 68 IN | RESPIRATION RATE: 16 BRPM | BODY MASS INDEX: 26.98 KG/M2 | WEIGHT: 178 LBS | DIASTOLIC BLOOD PRESSURE: 72 MMHG

## 2019-09-12 DIAGNOSIS — R00.2 PALPITATIONS: Primary | ICD-10-CM

## 2019-09-12 PROCEDURE — 99212 OFFICE O/P EST SF 10 MIN: CPT | Performed by: INTERNAL MEDICINE

## 2019-09-12 RX ORDER — PROPRANOLOL HYDROCHLORIDE 10 MG/1
10 TABLET ORAL 2 TIMES DAILY
Qty: 180 TABLET | Refills: 2 | Status: SHIPPED | OUTPATIENT
Start: 2019-09-12 | End: 2022-12-13 | Stop reason: HOSPADM

## 2019-09-12 RX ORDER — FLUTICASONE PROPIONATE 50 MCG
2 SPRAY, SUSPENSION (ML) NASAL DAILY
Refills: 2 | COMMUNITY
Start: 2019-08-11

## 2019-09-12 NOTE — PROGRESS NOTES
Sage Quevedo PA  Marlyn Joyakins  1980  09/12/2019    Patient Active Problem List   Diagnosis   • Kidney stone   • Palpitations       Dear Sage Quevedo PA:    Subjective     Marlyn Emmanuel is a 38 y.o. female with the problems as listed above, presents    Chief complaint: Follow-up of palpitations.    History of Present Illness: Ms. Marroquin is a pleasant 38-year-old  female with history of palpitations for which she was started on propranolol at last visit.  She is currently taking 10 mg twice a day and seem to be helping with her palpitations although she has some palpitations not as bad as before.  She complains of some swelling in her arms and legs.  She denies any shortness of breath.      Allergies   Allergen Reactions   • Glucophage [Metformin] Swelling   :      Current Outpatient Medications:   •  ALLERGY SERUM INJECTION, Inject  under the skin into the appropriate area as directed 1 (One) Time., Disp: , Rfl:   •  fluticasone (FLONASE) 50 MCG/ACT nasal spray, 2 sprays Daily., Disp: , Rfl: 2  •  LEVOTHYROXINE SODIUM PO, Take 125 mcg by mouth Daily., Disp: , Rfl:   •  montelukast (SINGULAIR) 10 MG tablet, Take 10 mg by mouth Every Night., Disp: , Rfl:   •  Multiple Vitamins-Minerals (MULTIVITAMIN ADULT PO), Take 1 tablet by mouth Daily., Disp: , Rfl:   •  omeprazole (priLOSEC) 40 MG capsule, Take 40 mg by mouth Daily., Disp: , Rfl:   •  propranolol (INDERAL) 10 MG tablet, Take 1 tablet by mouth 2 (Two) Times a Day., Disp: 180 tablet, Rfl: 2  •  Semaglutide (OZEMPIC) 1 MG/DOSE solution pen-injector, Inject 1 dose as directed 1 (One) Time Per Week., Disp: , Rfl:   •  spironolactone (ALDACTONE) 100 MG tablet, TAKE 1 2 (ONE HALF) TABLET BY MOUTH TWICE DAILY, Disp: , Rfl: 0      The following portions of the patient's history were reviewed and updated as appropriate: allergies, current medications, past family history, past medical history, past social history, past surgical history and problem  "list.    Social History     Tobacco Use   • Smoking status: Former Smoker     Packs/day: 1.00     Years: 2.00     Pack years: 2.00     Types: Cigarettes     Last attempt to quit: 2006     Years since quittin.7   • Smokeless tobacco: Never Used   Substance Use Topics   • Alcohol use: No   • Drug use: No       Review of Systems   Cardiovascular: Positive for leg swelling and palpitations. Negative for chest pain and syncope.   Respiratory: Negative for shortness of breath.    Neurological: Negative for dizziness.       Objective   Vitals:    19 1601   BP: 111/72   BP Location: Left arm   Pulse: 104   Resp: 16   SpO2: 99%   Weight: 80.7 kg (178 lb)   Height: 172.7 cm (67.99\")     Body mass index is 27.07 kg/m².        Physical Exam   Constitutional: She is oriented to person, place, and time. She appears well-developed and well-nourished.   HENT:   Mouth/Throat: Oropharynx is clear and moist.   Eyes: EOM are normal. Pupils are equal, round, and reactive to light.   Neck: Neck supple. No JVD present. No tracheal deviation present. No thyromegaly present.   Cardiovascular: Normal rate, regular rhythm, S1 normal and S2 normal. Exam reveals no gallop and no friction rub.   No murmur heard.  Pulmonary/Chest: Effort normal and breath sounds normal.   Abdominal: Soft. Bowel sounds are normal. She exhibits no mass. There is no tenderness.   Musculoskeletal: Normal range of motion. She exhibits no edema.   Lymphadenopathy:     She has no cervical adenopathy.   Neurological: She is alert and oriented to person, place, and time.   Skin: Skin is warm and dry. No rash noted.   Psychiatric: She has a normal mood and affect.       Lab Results   Component Value Date     03/15/2016    K 4.2 03/15/2016     03/15/2016    CO2 22.3 (L) 03/15/2016    BUN 12 03/15/2016    CREATININE 0.78 03/15/2016    GLUCOSE 89 03/15/2016    CALCIUM 9.1 03/15/2016    AST 21 03/15/2016    ALT 23 03/15/2016    ALKPHOS 42 03/15/2016    " LABIL2 1.5 03/15/2016     No results found for: CKTOTAL  Lab Results   Component Value Date    WBC 8.5 03/15/2016    HGB 12.5 03/15/2016    HCT 38.1 03/15/2016     03/15/2016         Procedures      Assessment/Plan    Diagnosis Plan   1. Palpitations, improved.         Recommendations:  Continue with propranolol 10 mg p.o. twice daily.    Return in about 7 months (around 4/12/2020).    As always, I appreciate very much the opportunity to participate in the cardiovascular care of your patients.      With Best Regards,    Shawn Harrington MD, [facility]    Dragon disclaimer:  Much of this encounter note is an electronic transcription/translation of spoken language to printed text. The electronic translation of spoken language may permit erroneous, or at times, nonsensical words or phrases to be inadvertently transcribed; Although I have reviewed the note for such errors, some may still exist.

## 2019-12-03 ENCOUNTER — TRANSCRIBE ORDERS (OUTPATIENT)
Dept: ADMINISTRATIVE | Facility: HOSPITAL | Age: 39
End: 2019-12-03

## 2019-12-03 ENCOUNTER — APPOINTMENT (OUTPATIENT)
Dept: LAB | Facility: HOSPITAL | Age: 39
End: 2019-12-03

## 2019-12-03 DIAGNOSIS — E55.9 VITAMIN D INSUFFICIENCY: Primary | ICD-10-CM

## 2019-12-03 LAB
BASOPHILS # BLD AUTO: 0.05 10*3/MM3 (ref 0–0.2)
BASOPHILS NFR BLD AUTO: 0.4 % (ref 0–1.5)
DEPRECATED RDW RBC AUTO: 43.1 FL (ref 37–54)
EOSINOPHIL # BLD AUTO: 0.11 10*3/MM3 (ref 0–0.4)
EOSINOPHIL NFR BLD AUTO: 0.9 % (ref 0.3–6.2)
ERYTHROCYTE [DISTWIDTH] IN BLOOD BY AUTOMATED COUNT: 12 % (ref 12.3–15.4)
HCT VFR BLD AUTO: 41.3 % (ref 34–46.6)
HGB BLD-MCNC: 13.6 G/DL (ref 12–15.9)
IMM GRANULOCYTES # BLD AUTO: 0.06 10*3/MM3 (ref 0–0.05)
IMM GRANULOCYTES NFR BLD AUTO: 0.5 % (ref 0–0.5)
LYMPHOCYTES # BLD AUTO: 2.69 10*3/MM3 (ref 0.7–3.1)
LYMPHOCYTES NFR BLD AUTO: 22.4 % (ref 19.6–45.3)
MCH RBC QN AUTO: 31.9 PG (ref 26.6–33)
MCHC RBC AUTO-ENTMCNC: 32.9 G/DL (ref 31.5–35.7)
MCV RBC AUTO: 96.7 FL (ref 79–97)
MONOCYTES # BLD AUTO: 0.8 10*3/MM3 (ref 0.1–0.9)
MONOCYTES NFR BLD AUTO: 6.7 % (ref 5–12)
NEUTROPHILS # BLD AUTO: 8.31 10*3/MM3 (ref 1.7–7)
NEUTROPHILS NFR BLD AUTO: 69.1 % (ref 42.7–76)
NRBC BLD AUTO-RTO: 0 /100 WBC (ref 0–0.2)
PLATELET # BLD AUTO: 347 10*3/MM3 (ref 140–450)
PMV BLD AUTO: 10.2 FL (ref 6–12)
RBC # BLD AUTO: 4.27 10*6/MM3 (ref 3.77–5.28)
WBC NRBC COR # BLD: 12.02 10*3/MM3 (ref 3.4–10.8)

## 2019-12-03 PROCEDURE — 85025 COMPLETE CBC W/AUTO DIFF WBC: CPT | Performed by: NURSE PRACTITIONER

## 2019-12-03 PROCEDURE — 36415 COLL VENOUS BLD VENIPUNCTURE: CPT | Performed by: NURSE PRACTITIONER

## 2021-03-03 ENCOUNTER — APPOINTMENT (OUTPATIENT)
Dept: MAMMOGRAPHY | Facility: HOSPITAL | Age: 41
End: 2021-03-03

## 2021-03-18 ENCOUNTER — BULK ORDERING (OUTPATIENT)
Dept: CASE MANAGEMENT | Facility: OTHER | Age: 41
End: 2021-03-18

## 2021-03-18 DIAGNOSIS — Z23 IMMUNIZATION DUE: ICD-10-CM

## 2022-10-19 DIAGNOSIS — M25.562 LEFT KNEE PAIN, UNSPECIFIED CHRONICITY: Primary | ICD-10-CM

## 2022-10-20 ENCOUNTER — OFFICE VISIT (OUTPATIENT)
Dept: ORTHOPEDIC SURGERY | Facility: CLINIC | Age: 42
End: 2022-10-20

## 2022-10-20 ENCOUNTER — HOSPITAL ENCOUNTER (OUTPATIENT)
Dept: GENERAL RADIOLOGY | Facility: HOSPITAL | Age: 42
Discharge: HOME | End: 2022-10-20
Admitting: PHYSICIAN ASSISTANT

## 2022-10-20 VITALS
BODY MASS INDEX: 30.07 KG/M2 | HEART RATE: 84 BPM | WEIGHT: 198.4 LBS | TEMPERATURE: 97.3 F | OXYGEN SATURATION: 96 % | HEIGHT: 68 IN | SYSTOLIC BLOOD PRESSURE: 127 MMHG | DIASTOLIC BLOOD PRESSURE: 75 MMHG

## 2022-10-20 DIAGNOSIS — M25.562 LEFT KNEE PAIN, UNSPECIFIED CHRONICITY: Primary | ICD-10-CM

## 2022-10-20 DIAGNOSIS — M25.562 LEFT KNEE PAIN, UNSPECIFIED CHRONICITY: ICD-10-CM

## 2022-10-20 PROCEDURE — 73562 X-RAY EXAM OF KNEE 3: CPT | Performed by: RADIOLOGY

## 2022-10-20 PROCEDURE — 99203 OFFICE O/P NEW LOW 30 MIN: CPT | Performed by: PHYSICIAN ASSISTANT

## 2022-10-20 PROCEDURE — 73562 X-RAY EXAM OF KNEE 3: CPT

## 2022-10-20 RX ORDER — SPIRONOLACTONE 50 MG/1
TABLET, FILM COATED ORAL
COMMUNITY

## 2022-10-20 RX ORDER — CELECOXIB 100 MG/1
100 CAPSULE ORAL 2 TIMES DAILY PRN
COMMUNITY

## 2022-10-20 RX ORDER — USTEKINUMAB 45 MG/.5ML
1 INJECTION, SOLUTION SUBCUTANEOUS
COMMUNITY
Start: 2022-08-29

## 2022-10-20 RX ORDER — PREDNISONE 20 MG/1
TABLET ORAL
COMMUNITY
Start: 2022-07-22

## 2022-10-26 NOTE — PROGRESS NOTES
[facility] [place] Orthopaedic Surgery New Patient Visit          Patient: Marlyn Emmanuel  YOB: 1980  Date of Encounter: 10/20/2022  PCP: Sarah Che APRN      Subjective     Chief Complaint   Patient presents with   • Left Knee - Pain, Edema           History of Present Illness:     Marlyn Emmanuel is a 41 y.o. female presents today as result of left knee pain with an acute on chronic injury.  Patient states that she suffered an injury as a teenager and was told that she had a meniscal tear however did not proceed with any further conservative treatment at the time.  She has had intermittent pain and symptoms with the knee over the years.  She reports swelling to the anterior lateral aspect of the knee at times with a cystic nodule that we will present and go down.  She reports difficulty upon getting up from seated position as well as step/stair ambulation at times.  Patient states that she had an acute injury prior to arrival in which she was walking down a set of stairs when the knee hyperextended and gave out and the anterior lateral cystic swelling was decreased with pain extending down the anterior lateral aspect of the knee.  Since that time she has had reduction of the swelling however still dull throbbing pain to the anterior lateral aspect of the knee.  She reports weakness of the kneecap with difficulty upon step/stair ambulation.  She reports no significant instability today.  Denies any paresthesias.        Patient Active Problem List   Diagnosis   • Kidney stone   • Palpitations     Past Medical History:   Diagnosis Date   • Anxiety    • Arthritis of back    • Arthritis of neck    • Asthma    • Disease of thyroid gland    • Hip arthrosis    • Kidney stone    • Knee swelling    • PCOS (polycystic ovarian syndrome)    • PCOS (polycystic ovarian syndrome)    • Periarthritis of shoulder    • Rotator cuff syndrome     Left shoulder   • Tear of meniscus of knee 1996     Past Surgical  History:   Procedure Laterality Date   • BILATERAL BREAST REDUCTION     • REDUCTION MAMMAPLASTY     • VAGINAL DELIVERY  ; 2010    2 daughters     Social History     Occupational History   • Not on file   Tobacco Use   • Smoking status: Former     Packs/day: 1.00     Years: 2.00     Pack years: 2.00     Types: Cigarettes     Quit date: 2006     Years since quittin.8   • Smokeless tobacco: Never   Vaping Use   • Vaping Use: Never used   Substance and Sexual Activity   • Alcohol use: Yes     Alcohol/week: 4.0 - 5.0 standard drinks     Types: 4 - 5 Glasses of wine per week   • Drug use: Never   • Sexual activity: Yes     Partners: Male     Birth control/protection: Vasectomy     Comment:  and has 2 daughters, Raoul Emmanuel  reports that she quit smoking about 16 years ago. Her smoking use included cigarettes. She has a 2.00 pack-year smoking history. She has never used smokeless tobacco.. I have educated her on the risk of diseases from using tobacco products such as cancer, COPD and heart disease.        Social History     Social History Narrative   • Not on file     Family History   Problem Relation Age of Onset   • Hypertension Mother    • No Known Problems Father    • Hypertension Sister    • Cancer Maternal Grandmother    • Cancer Maternal Grandfather    • Thyroid disease Paternal Grandmother    • Breast cancer Maternal Aunt    • Diabetes Maternal Aunt      Current Outpatient Medications   Medication Sig Dispense Refill   • celecoxib (CeleBREX) 100 MG capsule Take 1 capsule by mouth 2 (Two) Times a Day As Needed for Mild Pain.     • fluticasone (FLONASE) 50 MCG/ACT nasal spray 2 sprays Daily.  2   • LEVOTHYROXINE SODIUM PO Take 125 mcg by mouth Daily.     • spironolactone (ALDACTONE) 100 MG tablet TAKE 1 2 (ONE HALF) TABLET BY MOUTH TWICE DAILY  0   • spironolactone (ALDACTONE) 50 MG tablet spironolactone 50 mg tablet   TAKE 1 TABLET BY MOUTH ONCE DAILY     • Stelara 45  "MG/0.5ML solution prefilled syringe Injection      • ALLERGY SERUM INJECTION Inject  under the skin into the appropriate area as directed 1 (One) Time.     • montelukast (SINGULAIR) 10 MG tablet Take 10 mg by mouth Every Night.     • Multiple Vitamins-Minerals (MULTIVITAMIN ADULT PO) Take 1 tablet by mouth Daily.     • omeprazole (priLOSEC) 40 MG capsule Take 40 mg by mouth Daily.     • predniSONE (DELTASONE) 20 MG tablet TAKE 2 TABLETS BY MOUTH ONCE DAILY AS NEEDED PSORIATIC ARTHRITIS     • propranolol (INDERAL) 10 MG tablet Take 1 tablet by mouth 2 (Two) Times a Day. 180 tablet 2   • Semaglutide, 1 MG/DOSE, (OZEMPIC) 2 MG/1.5ML solution pen-injector Inject 1 dose as directed 1 (One) Time Per Week.       No current facility-administered medications for this visit.     Allergies   Allergen Reactions   • Glucophage [Metformin] Swelling   • Codeine Rash            Review of Systems   Constitutional: Negative.   HENT: Negative.    Eyes: Negative.    Cardiovascular: Negative.    Respiratory: Negative.    Endocrine: Negative.    Hematologic/Lymphatic: Negative.    Skin: Negative.    Musculoskeletal: Positive for back pain, joint pain, joint swelling and neck pain.        Pertinent positives listed in HPI   Gastrointestinal: Negative.    Genitourinary: Negative.    Neurological: Negative.    Psychiatric/Behavioral: Negative.    Allergic/Immunologic: Negative.          Objective      Vitals:    10/20/22 1511   BP: 127/75   BP Location: Right arm   Patient Position: Sitting   Cuff Size: Adult   Pulse: 84   Temp: 97.3 °F (36.3 °C)   TempSrc: Temporal   SpO2: 96%   Weight: 90 kg (198 lb 6.4 oz)   Height: 172.7 cm (67.99\")   PainSc:   6   PainLoc: Knee      BMI is >= 30 and <35. (Class 1 Obesity). The following options were offered after discussion;: exercise counseling/recommendations and nutrition counseling/recommendations      Physical Exam  Vitals and nursing note reviewed.   Constitutional:       General: She is not in " acute distress.     Appearance: She is not ill-appearing.   HENT:      Head: Normocephalic and atraumatic.      Right Ear: External ear normal.      Left Ear: External ear normal.      Nose: Nose normal. No congestion or rhinorrhea.   Eyes:      Extraocular Movements: Extraocular movements intact.      Conjunctiva/sclera: Conjunctivae normal.      Pupils: Pupils are equal, round, and reactive to light.   Cardiovascular:      Rate and Rhythm: Normal rate.      Pulses: Normal pulses.   Pulmonary:      Effort: Pulmonary effort is normal. No respiratory distress.      Breath sounds: No stridor.   Abdominal:      General: There is no distension.   Musculoskeletal:      Cervical back: Normal range of motion.      Left knee: Swelling, effusion, bony tenderness and crepitus present. No deformity, erythema, ecchymosis or lacerations. Normal range of motion. Tenderness present over the lateral joint line and patellar tendon. No LCL laxity, MCL laxity, ACL laxity or PCL laxity.Abnormal meniscus and abnormal patellar mobility. Normal alignment. Normal pulse.      Instability Tests: Anterior drawer test negative. Posterior drawer test negative. Anterior Lachman test negative. Lateral Rajiv test positive. Medial Rajiv test negative.   Skin:     General: Skin is warm and dry.      Capillary Refill: Capillary refill takes less than 2 seconds.   Neurological:      General: No focal deficit present.      Mental Status: She is alert and oriented to person, place, and time.   Psychiatric:         Mood and Affect: Mood normal.         Behavior: Behavior normal.         Thought Content: Thought content normal.         Judgment: Judgment normal.       Radiology:      XR Knee 3 View Left    Result Date: 10/21/2022  Early changes of osteoarthritis in the left knee.  This report was finalized on 10/21/2022 8:03 AM by Dr. Edmundo Herrera II, MD.              Assessment/Plan        ICD-10-CM ICD-9-CM   1. Left knee pain, unspecified  chronicity  M25.562 719.46     41-year-old female with left knee pain and effusion with lateral joint line tenderness and exam findings concerning for potential lateral meniscal pathology.  Patient has failed conservative treatment options and radiographs revealed cystic findings adjacent to the patella and has reference to the soft tissue mass as well as ongoing pain and symptoms recalcitrant to previous conservative treatment and the patient will undergo further diagnostic imaging.  She will return back upon completion for further evaluation.                        This document was signed by Reddy Emmanuel PA-C October 26, 2022     CC: Sarah Che APRN

## 2022-11-11 ENCOUNTER — HOSPITAL ENCOUNTER (OUTPATIENT)
Dept: MRI IMAGING | Facility: HOSPITAL | Age: 42
Discharge: HOME | End: 2022-11-11
Admitting: PHYSICIAN ASSISTANT

## 2022-11-11 DIAGNOSIS — M25.562 LEFT KNEE PAIN, UNSPECIFIED CHRONICITY: ICD-10-CM

## 2022-11-11 PROCEDURE — 73721 MRI JNT OF LWR EXTRE W/O DYE: CPT | Performed by: RADIOLOGY

## 2022-11-11 PROCEDURE — 73721 MRI JNT OF LWR EXTRE W/O DYE: CPT

## 2022-11-14 ENCOUNTER — OFFICE VISIT (OUTPATIENT)
Dept: ORTHOPEDIC SURGERY | Facility: CLINIC | Age: 42
End: 2022-11-14

## 2022-11-14 VITALS
SYSTOLIC BLOOD PRESSURE: 125 MMHG | HEART RATE: 87 BPM | HEIGHT: 68 IN | DIASTOLIC BLOOD PRESSURE: 82 MMHG | WEIGHT: 199.2 LBS | BODY MASS INDEX: 30.19 KG/M2

## 2022-11-14 DIAGNOSIS — S83.282D OTHER TEAR OF LATERAL MENISCUS OF LEFT KNEE AS CURRENT INJURY, SUBSEQUENT ENCOUNTER: Primary | ICD-10-CM

## 2022-11-14 PROCEDURE — 99214 OFFICE O/P EST MOD 30 MIN: CPT | Performed by: PHYSICIAN ASSISTANT

## 2022-11-14 NOTE — PROGRESS NOTES
[facility] [place] Orthopaedic Surgery [facility] and Physical          Patient: Marlyn Emmanuel  YOB: 1980  Date of Encounter: 11/14/2022  PCP: Sarah Che APRN      Subjective     Chief Complaint   Patient presents with   • Left Knee - Pain, Follow-up   • MRI Review           History of Present Illness:     Marlyn Emmanuel is a 41 y.o. female presents today for MRI results review left knee as result of left knee pain with an acute on chronic injury.  Patient states that she suffered an injury as a teenager and was told that she had a meniscal tear however did not proceed with any further conservative treatment at the time.  She has continue to have intermittent pain and symptoms with the knee over the years.  She reports swelling to the anterior lateral aspect of the knee at times with a cystic nodule that we will present and go down.  She reports difficulty upon getting up from seated position as well as step/stair ambulation at times.  Patient states that she had an acute injury several weeks ago in which she was walking down a set of stairs when the knee hyperextended and gave out and the anterior lateral cystic swelling was decreased with pain extending down the anterior lateral aspect of the knee.  Since that time she has had reduction of the swelling however still continue dull throbbing pain to the anterior lateral aspect of the knee.  She reports weakness of the kneecap with difficulty upon step/stair ambulation.  She reports no significant instability today.  Denies any paresthesias.        Patient Active Problem List   Diagnosis   • Kidney stone   • Palpitations   • Tear of lateral meniscus of left knee, current     Past Medical History:   Diagnosis Date   • Anxiety    • Arthritis of back    • Arthritis of neck    • Asthma    • Disease of thyroid gland    • Hip arthrosis    • Kidney stone    • Knee swelling    • PCOS (polycystic ovarian syndrome)    • PCOS (polycystic ovarian syndrome)     • Periarthritis of shoulder    • Rotator cuff syndrome     Left shoulder   • Tear of meniscus of knee      Past Surgical History:   Procedure Laterality Date   • BILATERAL BREAST REDUCTION     • REDUCTION MAMMAPLASTY     • VAGINAL DELIVERY  ;     2 daughters     Social History     Occupational History   • Not on file   Tobacco Use   • Smoking status: Former     Packs/day: 1.00     Years: 2.00     Pack years: 2.00     Types: Cigarettes     Quit date: 2006     Years since quittin.8   • Smokeless tobacco: Never   Vaping Use   • Vaping Use: Never used   Substance and Sexual Activity   • Alcohol use: Yes     Alcohol/week: 4.0 - 5.0 standard drinks     Types: 4 - 5 Glasses of wine per week   • Drug use: Never   • Sexual activity: Yes     Partners: Male     Birth control/protection: Vasectomy     Comment:  and has 2 daughters, Raoul Emmanuel  reports that she quit smoking about 16 years ago. Her smoking use included cigarettes. She has a 2.00 pack-year smoking history. She has never used smokeless tobacco.. I have educated her on the risk of diseases from using tobacco products such as cancer, COPD and heart disease.        Social History     Social History Narrative   • Not on file     Family History   Problem Relation Age of Onset   • Hypertension Mother    • No Known Problems Father    • Hypertension Sister    • Cancer Maternal Grandmother    • Cancer Maternal Grandfather    • Thyroid disease Paternal Grandmother    • Breast cancer Maternal Aunt    • Diabetes Maternal Aunt      Current Outpatient Medications   Medication Sig Dispense Refill   • celecoxib (CeleBREX) 100 MG capsule Take 1 capsule by mouth 2 (Two) Times a Day As Needed for Mild Pain.     • fluticasone (FLONASE) 50 MCG/ACT nasal spray 2 sprays Daily.  2   • LEVOTHYROXINE SODIUM PO Take 125 mcg by mouth Daily.     • Multiple Vitamins-Minerals (MULTIVITAMIN ADULT PO) Take 1 tablet by mouth Daily.     • omeprazole  "(priLOSEC) 40 MG capsule Take 40 mg by mouth Daily.     • predniSONE (DELTASONE) 20 MG tablet TAKE 2 TABLETS BY MOUTH ONCE DAILY AS NEEDED PSORIATIC ARTHRITIS     • spironolactone (ALDACTONE) 50 MG tablet spironolactone 50 mg tablet   TAKE 1 TABLET BY MOUTH ONCE DAILY     • Stelara 45 MG/0.5ML solution prefilled syringe Injection      • ALLERGY SERUM INJECTION Inject  under the skin into the appropriate area as directed 1 (One) Time.     • montelukast (SINGULAIR) 10 MG tablet Take 10 mg by mouth Every Night.     • propranolol (INDERAL) 10 MG tablet Take 1 tablet by mouth 2 (Two) Times a Day. 180 tablet 2   • Semaglutide, 1 MG/DOSE, (OZEMPIC) 2 MG/1.5ML solution pen-injector Inject 1 dose as directed 1 (One) Time Per Week.     • spironolactone (ALDACTONE) 100 MG tablet TAKE 1 2 (ONE HALF) TABLET BY MOUTH TWICE DAILY  0     No current facility-administered medications for this visit.     Allergies   Allergen Reactions   • Glucophage [Metformin] Swelling   • Codeine Rash            Review of Systems   Constitutional: Negative.   HENT: Negative.    Eyes: Negative.    Cardiovascular: Negative.    Respiratory: Negative.    Endocrine: Negative.    Hematologic/Lymphatic: Negative.    Skin: Negative.    Musculoskeletal: Positive for back pain, joint pain, joint swelling and neck pain.        Pertinent positives listed in HPI   Gastrointestinal: Negative.    Genitourinary: Negative.    Neurological: Negative.    Psychiatric/Behavioral: Negative.    Allergic/Immunologic: Negative.          Objective      Vitals:    11/14/22 1549   BP: 125/82   Pulse: 87   Weight: 90.4 kg (199 lb 3.2 oz)   Height: 172.7 cm (67.99\")      BMI is >= 30 and <35. (Class 1 Obesity). The following options were offered after discussion;: exercise counseling/recommendations and nutrition counseling/recommendations      Physical Exam  Vitals and nursing note reviewed.   Constitutional:       General: She is not in acute distress.     Appearance: She is " not ill-appearing.   HENT:      Head: Normocephalic and atraumatic.      Right Ear: External ear normal.      Left Ear: External ear normal.      Nose: Nose normal. No congestion or rhinorrhea.   Eyes:      Extraocular Movements: Extraocular movements intact.      Conjunctiva/sclera: Conjunctivae normal.      Pupils: Pupils are equal, round, and reactive to light.   Cardiovascular:      Rate and Rhythm: Normal rate and regular rhythm.      Pulses: Normal pulses.      Heart sounds: Normal heart sounds. No murmur heard.    No friction rub. No gallop.   Pulmonary:      Effort: Pulmonary effort is normal. No respiratory distress.      Breath sounds: No stridor. No wheezing, rhonchi or rales.   Abdominal:      General: There is no distension.   Musculoskeletal:      Cervical back: Normal range of motion.      Left knee: Swelling, effusion, bony tenderness and crepitus present. No deformity, erythema, ecchymosis or lacerations. Normal range of motion. Tenderness present over the lateral joint line and patellar tendon. No LCL laxity, MCL laxity, ACL laxity or PCL laxity.Abnormal meniscus and abnormal patellar mobility. Normal alignment. Normal pulse.      Instability Tests: Anterior drawer test negative. Posterior drawer test negative. Anterior Lachman test negative. Lateral Rajiv test positive. Medial Rajiv test negative.   Skin:     General: Skin is warm and dry.      Capillary Refill: Capillary refill takes less than 2 seconds.   Neurological:      General: No focal deficit present.      Mental Status: She is alert and oriented to person, place, and time.   Psychiatric:         Mood and Affect: Mood normal.         Behavior: Behavior normal.         Thought Content: Thought content normal.         Judgment: Judgment normal.       Radiology:      XR Knee 3 View Left    Result Date: 10/21/2022  Early changes of osteoarthritis in the left knee.  This report was finalized on 10/21/2022 8:03 AM by Dr. Edmundo Herrera  MD MAYNOR.      MRI Knee Left Without Contrast    Result Date: 11/11/2022  1.  Horizontal tear posterior horn lateral meniscus which extends into the meniscal body but no displaced fragment identified. Small vertical component is noted centrally. 2.  Adjacent perimeniscal cyst along the dorsal and lateral margin of the lateral meniscus that is 15.5 x 8.9 mm. 3.  Fluid in the posterior lateral corner indicative of inflammation. 4.  Intact cruciate and collateral ligaments. 5.  No bone contusion or fracture. 6.  Edema of the superior-lateral infrapatellar fat which can be seen in association with patellar maltracking disorders.  This report was finalized on 11/11/2022 3:42 PM by Dr. Martin Nowak MD.              Assessment/Plan        ICD-10-CM ICD-9-CM   1. Other tear of lateral meniscus of left knee as current injury, subsequent encounter  S83.282D V58.89     836.1        41-year-old female with an acute on chronic injury in which patient suffered exacerbation of quite noticeable horizontal tear posterior horn lateral meniscus was extends into the meniscal body.  There is a small vertical component noted centrally.  There is an adjacent meniscal cyst along the dorsal and lateral margin of the lateral menisci.  As result of the patient's ongoing pain and symptoms and absence of alleviation with conservative treatment options and the diagnostic MRI findings the patient will undergo left knee arthroscopy with partial lateral meniscectomy. Myself and Dr. Newman  reviewed details of procedure with patient today and discussed risks, benefits, alternatives, and limitations of the procedure in laymen's terms with the risks including but not limited to:  neurovascular damage, bleeding, infection, chronic pain, worsening of pain, swelling, loss of motion, weakness, stiffness, instability, DVT, pulmonary embolus, loss of life/limb, and potential need for additional procedures.  No guarantees were given regarding results of  surgery.     Patient was given the opportunity to ask and have all questions answered today.  The patient voiced understanding of the risks, benefits, and recalcitrance from conservative forms of treatment that were discussed and the patient consents to proceed with Left Knee Arthroscopy,  partial lateral meniscectomy .  Patient once again has been seen in consultation with Dr. Newman which is agreeable to notable procedure.  Patient will be placed on the OR schedule 12/13/2022.                  This document was signed by Reddy Emmanuel PA-C November 14, 2022     CC: Sarah Che APRN

## 2022-11-15 PROBLEM — S83.282A TEAR OF LATERAL MENISCUS OF LEFT KNEE, CURRENT: Status: ACTIVE | Noted: 2022-11-15

## 2022-12-07 NOTE — DISCHARGE INSTRUCTIONS

## 2022-12-09 ENCOUNTER — PRE-ADMISSION TESTING (OUTPATIENT)
Dept: PREADMISSION TESTING | Facility: HOSPITAL | Age: 42
End: 2022-12-09

## 2022-12-09 VITALS — BODY MASS INDEX: 29.55 KG/M2 | WEIGHT: 195 LBS | HEIGHT: 68 IN

## 2022-12-09 LAB
ANION GAP SERPL CALCULATED.3IONS-SCNC: 12.4 MMOL/L (ref 5–15)
BASOPHILS # BLD AUTO: 0.01 10*3/MM3 (ref 0–0.2)
BASOPHILS NFR BLD AUTO: 0.1 % (ref 0–1.5)
BUN SERPL-MCNC: 8 MG/DL (ref 6–20)
BUN/CREAT SERPL: 11.1 (ref 7–25)
CALCIUM SPEC-SCNC: 9.4 MG/DL (ref 8.6–10.5)
CHLORIDE SERPL-SCNC: 104 MMOL/L (ref 98–107)
CO2 SERPL-SCNC: 21.6 MMOL/L (ref 22–29)
CREAT SERPL-MCNC: 0.72 MG/DL (ref 0.57–1)
DEPRECATED RDW RBC AUTO: 43.1 FL (ref 37–54)
EGFRCR SERPLBLD CKD-EPI 2021: 107.9 ML/MIN/1.73
EOSINOPHIL # BLD AUTO: 0 10*3/MM3 (ref 0–0.4)
EOSINOPHIL NFR BLD AUTO: 0 % (ref 0.3–6.2)
ERYTHROCYTE [DISTWIDTH] IN BLOOD BY AUTOMATED COUNT: 12.2 % (ref 12.3–15.4)
GLUCOSE SERPL-MCNC: 84 MG/DL (ref 65–99)
HCT VFR BLD AUTO: 42.9 % (ref 34–46.6)
HGB BLD-MCNC: 14.1 G/DL (ref 12–15.9)
IMM GRANULOCYTES # BLD AUTO: 0.04 10*3/MM3 (ref 0–0.05)
IMM GRANULOCYTES NFR BLD AUTO: 0.4 % (ref 0–0.5)
LYMPHOCYTES # BLD AUTO: 1.29 10*3/MM3 (ref 0.7–3.1)
LYMPHOCYTES NFR BLD AUTO: 12.2 % (ref 19.6–45.3)
MCH RBC QN AUTO: 31.2 PG (ref 26.6–33)
MCHC RBC AUTO-ENTMCNC: 32.9 G/DL (ref 31.5–35.7)
MCV RBC AUTO: 94.9 FL (ref 79–97)
MONOCYTES # BLD AUTO: 0.76 10*3/MM3 (ref 0.1–0.9)
MONOCYTES NFR BLD AUTO: 7.2 % (ref 5–12)
NEUTROPHILS NFR BLD AUTO: 8.48 10*3/MM3 (ref 1.7–7)
NEUTROPHILS NFR BLD AUTO: 80.1 % (ref 42.7–76)
NRBC BLD AUTO-RTO: 0 /100 WBC (ref 0–0.2)
PLATELET # BLD AUTO: 344 10*3/MM3 (ref 140–450)
PMV BLD AUTO: 9.8 FL (ref 6–12)
POTASSIUM SERPL-SCNC: 4.2 MMOL/L (ref 3.5–5.2)
RBC # BLD AUTO: 4.52 10*6/MM3 (ref 3.77–5.28)
SODIUM SERPL-SCNC: 138 MMOL/L (ref 136–145)
WBC NRBC COR # BLD: 10.58 10*3/MM3 (ref 3.4–10.8)

## 2022-12-09 PROCEDURE — 80048 BASIC METABOLIC PNL TOTAL CA: CPT

## 2022-12-09 PROCEDURE — 85025 COMPLETE CBC W/AUTO DIFF WBC: CPT

## 2022-12-09 PROCEDURE — 36415 COLL VENOUS BLD VENIPUNCTURE: CPT

## 2022-12-13 ENCOUNTER — ANESTHESIA EVENT (OUTPATIENT)
Dept: PERIOP | Facility: HOSPITAL | Age: 42
End: 2022-12-13

## 2022-12-13 ENCOUNTER — HOSPITAL ENCOUNTER (OUTPATIENT)
Facility: HOSPITAL | Age: 42
Setting detail: HOSPITAL OUTPATIENT SURGERY
Discharge: HOME | End: 2022-12-13
Attending: ORTHOPAEDIC SURGERY | Admitting: ORTHOPAEDIC SURGERY

## 2022-12-13 ENCOUNTER — APPOINTMENT (OUTPATIENT)
Dept: GENERAL RADIOLOGY | Facility: HOSPITAL | Age: 42
End: 2022-12-13

## 2022-12-13 ENCOUNTER — ANESTHESIA (OUTPATIENT)
Dept: PERIOP | Facility: HOSPITAL | Age: 42
End: 2022-12-13

## 2022-12-13 VITALS
HEIGHT: 68 IN | RESPIRATION RATE: 18 BRPM | SYSTOLIC BLOOD PRESSURE: 121 MMHG | DIASTOLIC BLOOD PRESSURE: 76 MMHG | BODY MASS INDEX: 29.86 KG/M2 | TEMPERATURE: 97.9 F | WEIGHT: 197 LBS | HEART RATE: 79 BPM | OXYGEN SATURATION: 99 %

## 2022-12-13 DIAGNOSIS — S83.282D OTHER TEAR OF LATERAL MENISCUS OF LEFT KNEE AS CURRENT INJURY, SUBSEQUENT ENCOUNTER: ICD-10-CM

## 2022-12-13 PROBLEM — S83.282A TEAR OF LATERAL MENISCUS OF LEFT KNEE, CURRENT: Status: RESOLVED | Noted: 2022-11-15 | Resolved: 2022-12-13

## 2022-12-13 LAB
B-HCG UR QL: NEGATIVE
EXPIRATION DATE: NORMAL
INTERNAL NEGATIVE CONTROL: NEGATIVE
INTERNAL POSITIVE CONTROL: POSITIVE
Lab: NORMAL

## 2022-12-13 PROCEDURE — 29881 ARTHRS KNE SRG MNISECTMY M/L: CPT | Performed by: ORTHOPAEDIC SURGERY

## 2022-12-13 PROCEDURE — 25010000002 KETOROLAC TROMETHAMINE PER 15 MG: Performed by: NURSE ANESTHETIST, CERTIFIED REGISTERED

## 2022-12-13 PROCEDURE — 25010000002 DROPERIDOL PER 5 MG: Performed by: ANESTHESIOLOGY

## 2022-12-13 PROCEDURE — L1830 KO IMMOB CANVAS LONG PRE OTS: HCPCS | Performed by: ORTHOPAEDIC SURGERY

## 2022-12-13 PROCEDURE — 25010000002 ONDANSETRON PER 1 MG: Performed by: NURSE ANESTHETIST, CERTIFIED REGISTERED

## 2022-12-13 PROCEDURE — 25010000002 FENTANYL CITRATE (PF) 50 MCG/ML SOLUTION: Performed by: NURSE ANESTHETIST, CERTIFIED REGISTERED

## 2022-12-13 PROCEDURE — 81025 URINE PREGNANCY TEST: CPT | Performed by: ANESTHESIOLOGY

## 2022-12-13 PROCEDURE — 0 MEPERIDINE PER 100 MG: Performed by: NURSE ANESTHETIST, CERTIFIED REGISTERED

## 2022-12-13 PROCEDURE — 25010000002 MIDAZOLAM PER 1MG: Performed by: NURSE ANESTHETIST, CERTIFIED REGISTERED

## 2022-12-13 PROCEDURE — 25010000002 PROPOFOL 10 MG/ML EMULSION: Performed by: NURSE ANESTHETIST, CERTIFIED REGISTERED

## 2022-12-13 RX ORDER — SODIUM CHLORIDE, SODIUM LACTATE, POTASSIUM CHLORIDE, CALCIUM CHLORIDE 600; 310; 30; 20 MG/100ML; MG/100ML; MG/100ML; MG/100ML
INJECTION, SOLUTION INTRAVENOUS CONTINUOUS PRN
Status: DISCONTINUED | OUTPATIENT
Start: 2022-12-13 | End: 2022-12-13 | Stop reason: SURG

## 2022-12-13 RX ORDER — OXYCODONE HYDROCHLORIDE AND ACETAMINOPHEN 5; 325 MG/1; MG/1
1 TABLET ORAL ONCE AS NEEDED
Status: DISCONTINUED | OUTPATIENT
Start: 2022-12-13 | End: 2022-12-13 | Stop reason: HOSPADM

## 2022-12-13 RX ORDER — HYDROCODONE BITARTRATE AND ACETAMINOPHEN 7.5; 325 MG/1; MG/1
1 TABLET ORAL EVERY 6 HOURS PRN
Qty: 8 TABLET | Refills: 0 | Status: SHIPPED | OUTPATIENT
Start: 2022-12-13

## 2022-12-13 RX ORDER — KETOROLAC TROMETHAMINE 30 MG/ML
INJECTION, SOLUTION INTRAMUSCULAR; INTRAVENOUS AS NEEDED
Status: DISCONTINUED | OUTPATIENT
Start: 2022-12-13 | End: 2022-12-13 | Stop reason: SURG

## 2022-12-13 RX ORDER — MAGNESIUM HYDROXIDE 1200 MG/15ML
LIQUID ORAL AS NEEDED
Status: DISCONTINUED | OUTPATIENT
Start: 2022-12-13 | End: 2022-12-13 | Stop reason: HOSPADM

## 2022-12-13 RX ORDER — PROPOFOL 10 MG/ML
VIAL (ML) INTRAVENOUS AS NEEDED
Status: DISCONTINUED | OUTPATIENT
Start: 2022-12-13 | End: 2022-12-13 | Stop reason: SURG

## 2022-12-13 RX ORDER — FENTANYL CITRATE 50 UG/ML
50 INJECTION, SOLUTION INTRAMUSCULAR; INTRAVENOUS
Status: DISCONTINUED | OUTPATIENT
Start: 2022-12-13 | End: 2022-12-13 | Stop reason: HOSPADM

## 2022-12-13 RX ORDER — SODIUM CHLORIDE 9 MG/ML
40 INJECTION, SOLUTION INTRAVENOUS AS NEEDED
Status: DISCONTINUED | OUTPATIENT
Start: 2022-12-13 | End: 2022-12-13 | Stop reason: HOSPADM

## 2022-12-13 RX ORDER — DROPERIDOL 2.5 MG/ML
0.62 INJECTION, SOLUTION INTRAMUSCULAR; INTRAVENOUS ONCE
Status: COMPLETED | OUTPATIENT
Start: 2022-12-13 | End: 2022-12-13

## 2022-12-13 RX ORDER — SODIUM CHLORIDE 0.9 % (FLUSH) 0.9 %
10 SYRINGE (ML) INJECTION AS NEEDED
Status: DISCONTINUED | OUTPATIENT
Start: 2022-12-13 | End: 2022-12-13 | Stop reason: HOSPADM

## 2022-12-13 RX ORDER — SCOLOPAMINE TRANSDERMAL SYSTEM 1 MG/1
1 PATCH, EXTENDED RELEASE TRANSDERMAL ONCE
Status: DISCONTINUED | OUTPATIENT
Start: 2022-12-13 | End: 2022-12-13 | Stop reason: HOSPADM

## 2022-12-13 RX ORDER — SODIUM CHLORIDE, SODIUM LACTATE, POTASSIUM CHLORIDE, CALCIUM CHLORIDE 600; 310; 30; 20 MG/100ML; MG/100ML; MG/100ML; MG/100ML
125 INJECTION, SOLUTION INTRAVENOUS ONCE
Status: COMPLETED | OUTPATIENT
Start: 2022-12-13 | End: 2022-12-13

## 2022-12-13 RX ORDER — SODIUM CHLORIDE, SODIUM LACTATE, POTASSIUM CHLORIDE, CALCIUM CHLORIDE 600; 310; 30; 20 MG/100ML; MG/100ML; MG/100ML; MG/100ML
100 INJECTION, SOLUTION INTRAVENOUS ONCE AS NEEDED
Status: DISCONTINUED | OUTPATIENT
Start: 2022-12-13 | End: 2022-12-13 | Stop reason: HOSPADM

## 2022-12-13 RX ORDER — ONDANSETRON 2 MG/ML
4 INJECTION INTRAMUSCULAR; INTRAVENOUS AS NEEDED
Status: DISCONTINUED | OUTPATIENT
Start: 2022-12-13 | End: 2022-12-13 | Stop reason: HOSPADM

## 2022-12-13 RX ORDER — MIDAZOLAM HYDROCHLORIDE 1 MG/ML
1 INJECTION INTRAMUSCULAR; INTRAVENOUS
Status: DISCONTINUED | OUTPATIENT
Start: 2022-12-13 | End: 2022-12-13 | Stop reason: HOSPADM

## 2022-12-13 RX ORDER — MIDAZOLAM HYDROCHLORIDE 2 MG/2ML
INJECTION, SOLUTION INTRAMUSCULAR; INTRAVENOUS AS NEEDED
Status: DISCONTINUED | OUTPATIENT
Start: 2022-12-13 | End: 2022-12-13 | Stop reason: SURG

## 2022-12-13 RX ORDER — ONDANSETRON 2 MG/ML
INJECTION INTRAMUSCULAR; INTRAVENOUS AS NEEDED
Status: DISCONTINUED | OUTPATIENT
Start: 2022-12-13 | End: 2022-12-13 | Stop reason: SURG

## 2022-12-13 RX ORDER — FENTANYL CITRATE 50 UG/ML
INJECTION, SOLUTION INTRAMUSCULAR; INTRAVENOUS AS NEEDED
Status: DISCONTINUED | OUTPATIENT
Start: 2022-12-13 | End: 2022-12-13 | Stop reason: SURG

## 2022-12-13 RX ORDER — IPRATROPIUM BROMIDE AND ALBUTEROL SULFATE 2.5; .5 MG/3ML; MG/3ML
3 SOLUTION RESPIRATORY (INHALATION) ONCE AS NEEDED
Status: DISCONTINUED | OUTPATIENT
Start: 2022-12-13 | End: 2022-12-13 | Stop reason: HOSPADM

## 2022-12-13 RX ORDER — MEPERIDINE HYDROCHLORIDE 25 MG/ML
12.5 INJECTION INTRAMUSCULAR; INTRAVENOUS; SUBCUTANEOUS
Status: COMPLETED | OUTPATIENT
Start: 2022-12-13 | End: 2022-12-13

## 2022-12-13 RX ORDER — FAMOTIDINE 10 MG/ML
INJECTION, SOLUTION INTRAVENOUS AS NEEDED
Status: DISCONTINUED | OUTPATIENT
Start: 2022-12-13 | End: 2022-12-13 | Stop reason: SURG

## 2022-12-13 RX ORDER — SODIUM CHLORIDE 0.9 % (FLUSH) 0.9 %
10 SYRINGE (ML) INJECTION EVERY 12 HOURS SCHEDULED
Status: DISCONTINUED | OUTPATIENT
Start: 2022-12-13 | End: 2022-12-13 | Stop reason: HOSPADM

## 2022-12-13 RX ADMIN — ONDANSETRON 4 MG: 2 INJECTION INTRAMUSCULAR; INTRAVENOUS at 13:07

## 2022-12-13 RX ADMIN — FENTANYL CITRATE 50 MCG: 50 INJECTION, SOLUTION INTRAMUSCULAR; INTRAVENOUS at 14:41

## 2022-12-13 RX ADMIN — MEPERIDINE HYDROCHLORIDE 12.5 MG: 25 INJECTION INTRAMUSCULAR; INTRAVENOUS; SUBCUTANEOUS at 14:40

## 2022-12-13 RX ADMIN — SODIUM CHLORIDE, POTASSIUM CHLORIDE, SODIUM LACTATE AND CALCIUM CHLORIDE: 600; 310; 30; 20 INJECTION, SOLUTION INTRAVENOUS at 13:02

## 2022-12-13 RX ADMIN — FENTANYL CITRATE 100 MCG: 50 INJECTION INTRAMUSCULAR; INTRAVENOUS at 13:07

## 2022-12-13 RX ADMIN — MIDAZOLAM HYDROCHLORIDE 2 MG: 1 INJECTION, SOLUTION INTRAMUSCULAR; INTRAVENOUS at 13:02

## 2022-12-13 RX ADMIN — FENTANYL CITRATE 50 MCG: 50 INJECTION, SOLUTION INTRAMUSCULAR; INTRAVENOUS at 14:46

## 2022-12-13 RX ADMIN — KETOROLAC TROMETHAMINE 30 MG: 30 INJECTION, SOLUTION INTRAMUSCULAR; INTRAVENOUS at 14:10

## 2022-12-13 RX ADMIN — SODIUM CHLORIDE, POTASSIUM CHLORIDE, SODIUM LACTATE AND CALCIUM CHLORIDE 125 ML/HR: 600; 310; 30; 20 INJECTION, SOLUTION INTRAVENOUS at 09:18

## 2022-12-13 RX ADMIN — ONDANSETRON 4 MG: 2 INJECTION INTRAMUSCULAR; INTRAVENOUS at 14:44

## 2022-12-13 RX ADMIN — DROPERIDOL 0.62 MG: 2.5 INJECTION, SOLUTION INTRAMUSCULAR; INTRAVENOUS at 15:16

## 2022-12-13 RX ADMIN — MEPERIDINE HYDROCHLORIDE 12.5 MG: 25 INJECTION INTRAMUSCULAR; INTRAVENOUS; SUBCUTANEOUS at 14:45

## 2022-12-13 RX ADMIN — FAMOTIDINE 20 MG: 10 INJECTION, SOLUTION INTRAVENOUS at 13:07

## 2022-12-13 RX ADMIN — SCOPALAMINE 1 PATCH: 1 PATCH, EXTENDED RELEASE TRANSDERMAL at 09:22

## 2022-12-13 RX ADMIN — PROPOFOL 150 MG: 10 INJECTION, EMULSION INTRAVENOUS at 13:07

## 2022-12-13 NOTE — OP NOTE
KNEE ARTHROSCOPY WITH CHONDROPLASTY  Procedure Note    Marlyn Emmanuel  12/13/2022    Pre-op Diagnosis:   Lateral meniscus tear left knee    Post-op Diagnosis:     Post-Op Diagnosis Codes:     Same           Procedure(s):  LEFT KNEE ARTHROSCOPY WITH PARTIAL LATERAL MENISCECTOMY    Surgeon(s):  Dudley Newman MD    Anesthesia: General/local    Operative technique: With patient in the operating theatre under general anesthesia with left leg and leg coffey and tourniquet extremity sterilely prepped and draped exsanguinated tourniquet inflated to 250 mmHg.  Standard arthroscopy portals were created with the knee inflated with sterile saline the arthroscope was introduced through the anterolateral portal and passed proximally into the suprapatellar pouch.  Patellofemoral joint pristine medial gutter clean medial compartment pristine medial meniscus stable by probing anterior cruciate ligament intact.  Lateral compartment entered and tear involving the posterior horn lateral meniscus was identified it extended to the mid body.  Tear was delineated with probe it was then removed piecemeal alternating with punch and patellar shaver debriding superior and inferior leaves to stable meniscus.  Final probing revealed the remaining meniscus to be stable.  Debris was evacuated from the knee water expressed portals injected with 10 cc 0.5 Marcaine wounds closed with 3-0 nylon was sterile dressing applied she was taken to recovery room in stable condition.    Staff:   Circulator: Hetal Blanco RN  Scrub Person: Emely Lieberman  Assistant: Kj Mcguire    Estimated Blood Loss: none    Specimens:   none               Implants/Grafts: none      Drains: None    Complications: none    Tourniquet time: 53   min    Dudley Newman MD     Date: 12/13/2022  Time: 14:34 EST    Cc: Sarah Che APRN

## 2022-12-13 NOTE — ANESTHESIA PREPROCEDURE EVALUATION
Anesthesia Evaluation     Patient summary reviewed and Nursing notes reviewed   history of anesthetic complications: PONV  NPO Solid Status: > 8 hours  NPO Liquid Status: > 8 hours           Airway   Mallampati: I  TM distance: >3 FB  Dental - normal exam     Pulmonary     breath sounds clear to auscultation  (+) asthma,  Cardiovascular   Exercise tolerance: good (4-7 METS)    Rhythm: regular  Rate: normal    (+) dysrhythmias Tachycardia,       Neuro/Psych- negative ROS  GI/Hepatic/Renal/Endo    (+)   renal disease stones, thyroid problem hypothyroidism    Musculoskeletal     Abdominal     Abdomen: soft.   Substance History - negative use     OB/GYN negative ob/gyn ROS         Other   arthritis,      ROS/Med Hx Other: History of tachycardia. Cleared in 2019                Anesthesia Plan    ASA 2     general     intravenous induction     Anesthetic plan, risks, benefits, and alternatives have been provided, discussed and informed consent has been obtained with: patient.    Plan discussed with CRNA.        CODE STATUS:

## 2022-12-13 NOTE — ANESTHESIA POSTPROCEDURE EVALUATION
Patient: Marlyn Emmanuel    Procedure Summary     Date: 12/13/22 Room / Location: [facility] OR  / [facility] OR    Anesthesia Start: 1302 Anesthesia Stop: 1423    Procedure: LEFT KNEE ARTHROSCOPY WITH PARTIAL LATERAL MENISCECTOMY (Left: Knee) Diagnosis:       Other tear of lateral meniscus of left knee as current injury, subsequent encounter      (Other tear of lateral meniscus of left knee as current injury, subsequent encounter [S83.282F])    Surgeons: Dudley Newman MD Provider: Anthony Gong MD    Anesthesia Type: general ASA Status: 2          Anesthesia Type: general    Vitals  Vitals Value Taken Time   /78 12/13/22 1450   Temp 98.2 °F (36.8 °C) 12/13/22 1424   Pulse 62 12/13/22 1450   Resp 14 12/13/22 1450   SpO2 99 % 12/13/22 1450           Post Anesthesia Care and Evaluation    Patient location during evaluation: PACU  Patient participation: complete - patient participated  Level of consciousness: awake  Pain score: 0  Pain management: adequate    Airway patency: patent  Anesthetic complications: No anesthetic complications  PONV Status: none  Cardiovascular status: acceptable  Respiratory status: acceptable  Hydration status: acceptable

## 2022-12-19 ENCOUNTER — OFFICE VISIT (OUTPATIENT)
Dept: ORTHOPEDIC SURGERY | Facility: CLINIC | Age: 42
End: 2022-12-19

## 2022-12-19 VITALS — HEIGHT: 68 IN | WEIGHT: 197.09 LBS | BODY MASS INDEX: 29.87 KG/M2

## 2022-12-19 DIAGNOSIS — Z98.890 S/P ARTHROSCOPIC PARTIAL LATERAL MENISCECTOMY: Primary | ICD-10-CM

## 2022-12-19 DIAGNOSIS — Z09 POSTOP CHECK: ICD-10-CM

## 2022-12-19 PROCEDURE — 99024 POSTOP FOLLOW-UP VISIT: CPT | Performed by: ORTHOPAEDIC SURGERY

## 2022-12-19 NOTE — PROGRESS NOTES
Post-OP Visit        Patient: Marlyn Emmanuel  YOB: 1980  Date of Encounter: 12/19/2022      Chief Complaint:   Chief Complaint   Patient presents with   • Left Knee - Post-op Knee     Procedure(s):12/13/2022  LEFT KNEE ARTHROSCOPY WITH PARTIAL LATERAL MENISCECTOMY     Surgeon(s):  Dudley Newman MD            HPI:  Marlyn Emmanuel, 42 y.o. female returns in postoperative follow-up arthroscopic partial lateral meniscectomy left knee 6 days ago.  She is doing well.      Medical History:  Patient Active Problem List   Diagnosis   • Kidney stone   • Palpitations     Past Medical History:   Diagnosis Date   • Anxiety    • Arthritis of back    • Arthritis of neck    • Asthma    • Disease of thyroid gland     hasimoto   • GERD (gastroesophageal reflux disease)    • Hip arthrosis    • Kidney stone    • Knee swelling    • PCOS (polycystic ovarian syndrome)    • PCOS (polycystic ovarian syndrome)    • Periarthritis of shoulder    • PONV (postoperative nausea and vomiting)    • Rotator cuff syndrome     Left shoulder   • Tear of meniscus of knee 1996         Surgical History:  Past Surgical History:   Procedure Laterality Date   • BILATERAL BREAST REDUCTION     • REDUCTION MAMMAPLASTY  2014   • VAGINAL DELIVERY  2006; 2010    2 daughters         Examination:  Examination left knee reveals minimal effusion with intact arthroscopy portals.        Assessment & Plan:  42 y.o. female presents in follow-up left knee arthroscopy partial lateral meniscectomy doing well eager to attend physical therapy and she is referred [facility].  She is placed off work until January 3, 2022.  She will schedule follow-up in 6 weeks.       Diagnosis Plan   1. S/P arthroscopic partial lateral meniscectomy  Ambulatory Referral to Physical Therapy POST OP, Evaluate and treat; Strengthening (quadricep), ROM; Full weight bearing      2. Postop check  Ambulatory Referral to Physical Therapy POST OP, Evaluate and treat;  Strengthening (quadricep), ROM; Full weight bearing                Cc:  Sarah Che, ADDIS              This document has been electronically signed by Dudley Newman MD   December 19, 2022 13:15 EST

## 2024-01-31 ENCOUNTER — TRANSCRIBE ORDERS (OUTPATIENT)
Dept: ADMINISTRATIVE | Facility: HOSPITAL | Age: 44
End: 2024-01-31
Payer: COMMERCIAL

## 2024-01-31 DIAGNOSIS — R10.2 PELVIC AND PERINEAL PAIN: Primary | ICD-10-CM

## 2024-02-06 ENCOUNTER — HOSPITAL ENCOUNTER (OUTPATIENT)
Dept: ULTRASOUND IMAGING | Facility: HOSPITAL | Age: 44
Discharge: HOME | End: 2024-02-06
Admitting: PHYSICIAN ASSISTANT
Payer: COMMERCIAL

## 2024-02-06 DIAGNOSIS — R10.2 PELVIC AND PERINEAL PAIN: ICD-10-CM

## 2024-02-06 PROCEDURE — 76856 US EXAM PELVIC COMPLETE: CPT

## 2024-02-06 PROCEDURE — 76856 US EXAM PELVIC COMPLETE: CPT | Performed by: RADIOLOGY

## 2024-04-17 ENCOUNTER — TELEPHONE (OUTPATIENT)
Dept: GENETICS | Facility: HOSPITAL | Age: 44
End: 2024-04-17
Payer: COMMERCIAL

## 2024-04-17 NOTE — TELEPHONE ENCOUNTER
Called pt regarding fax in genetic referral from [facility]. Left message on pt's vm. Will switch to pt to schedule and stand by.

## 2025-04-26 ENCOUNTER — LAB (OUTPATIENT)
Dept: LAB | Facility: HOSPITAL | Age: 45
End: 2025-04-26
Payer: COMMERCIAL

## 2025-04-26 ENCOUNTER — TRANSCRIBE ORDERS (OUTPATIENT)
Dept: ADMINISTRATIVE | Facility: HOSPITAL | Age: 45
End: 2025-04-26
Payer: COMMERCIAL

## 2025-04-26 DIAGNOSIS — M25.50 ARTHRALGIA, UNSPECIFIED JOINT: ICD-10-CM

## 2025-04-26 DIAGNOSIS — E34.8 OTHER SPECIFIED ENDOCRINE DISORDERS: ICD-10-CM

## 2025-04-26 DIAGNOSIS — R68.82 LOW LIBIDO: ICD-10-CM

## 2025-04-26 DIAGNOSIS — N95.1 PERIMENOPAUSE: Primary | ICD-10-CM

## 2025-04-26 DIAGNOSIS — N95.1 PERIMENOPAUSE: ICD-10-CM

## 2025-04-26 DIAGNOSIS — G47.00 INSOMNIA, UNSPECIFIED TYPE: ICD-10-CM

## 2025-04-26 DIAGNOSIS — R53.83 TIREDNESS: ICD-10-CM

## 2025-04-26 LAB
25(OH)D3 SERPL-MCNC: 33.9 NG/ML (ref 30–100)
ALBUMIN SERPL-MCNC: 4.3 G/DL (ref 3.5–5.2)
ALBUMIN/GLOB SERPL: 1.4 G/DL
ALP SERPL-CCNC: 82 U/L (ref 39–117)
ALT SERPL W P-5'-P-CCNC: 18 U/L (ref 1–33)
ANION GAP SERPL CALCULATED.3IONS-SCNC: 9.8 MMOL/L (ref 5–15)
AST SERPL-CCNC: 20 U/L (ref 1–32)
BILIRUB SERPL-MCNC: 0.4 MG/DL (ref 0–1.2)
BUN SERPL-MCNC: 11 MG/DL (ref 6–20)
BUN/CREAT SERPL: 13.8 (ref 7–25)
CALCIUM SPEC-SCNC: 9.3 MG/DL (ref 8.6–10.5)
CHLORIDE SERPL-SCNC: 106 MMOL/L (ref 98–107)
CO2 SERPL-SCNC: 23.2 MMOL/L (ref 22–29)
CREAT SERPL-MCNC: 0.8 MG/DL (ref 0.57–1)
CRP SERPL-MCNC: 0.36 MG/DL (ref 0–0.5)
EGFRCR SERPLBLD CKD-EPI 2021: 93.3 ML/MIN/1.73
FERRITIN SERPL-MCNC: 59 NG/ML (ref 13–150)
GLOBULIN UR ELPH-MCNC: 3.1 GM/DL
GLUCOSE SERPL-MCNC: 89 MG/DL (ref 65–99)
HBA1C MFR BLD: 5.5 % (ref 4.8–5.6)
HCYS SERPL-MCNC: 8.3 UMOL/L (ref 0–15)
POTASSIUM SERPL-SCNC: 4.2 MMOL/L (ref 3.5–5.2)
PROT SERPL-MCNC: 7.4 G/DL (ref 6–8.5)
SODIUM SERPL-SCNC: 139 MMOL/L (ref 136–145)
VIT B12 BLD-MCNC: >2000 PG/ML (ref 211–946)

## 2025-04-26 PROCEDURE — 84403 ASSAY OF TOTAL TESTOSTERONE: CPT

## 2025-04-26 PROCEDURE — 83090 ASSAY OF HOMOCYSTEINE: CPT

## 2025-04-26 PROCEDURE — 83036 HEMOGLOBIN GLYCOSYLATED A1C: CPT

## 2025-04-26 PROCEDURE — 36415 COLL VENOUS BLD VENIPUNCTURE: CPT

## 2025-04-26 PROCEDURE — 86140 C-REACTIVE PROTEIN: CPT

## 2025-04-26 PROCEDURE — 82728 ASSAY OF FERRITIN: CPT

## 2025-04-26 PROCEDURE — 84402 ASSAY OF FREE TESTOSTERONE: CPT

## 2025-04-26 PROCEDURE — 84270 ASSAY OF SEX HORMONE GLOBUL: CPT

## 2025-04-26 PROCEDURE — 82306 VITAMIN D 25 HYDROXY: CPT

## 2025-04-26 PROCEDURE — 82607 VITAMIN B-12: CPT

## 2025-04-26 PROCEDURE — 82627 DEHYDROEPIANDROSTERONE: CPT

## 2025-04-26 PROCEDURE — 80053 COMPREHEN METABOLIC PANEL: CPT

## 2025-04-28 LAB — DHEA-S SERPL-MCNC: 69.5 UG/DL (ref 57.3–279.2)

## 2025-04-29 LAB
SPECIMEN STATUS: NORMAL
TESTOST FREE SERPL-MCNC: 0.8 PG/ML (ref 0–4.2)
TESTOST SERPL-MCNC: 10 NG/DL (ref 4–50)

## 2025-05-01 LAB — SHBG SERPL-SCNC: 15.6 NMOL/L (ref 24.6–122)

## (undated) DEVICE — SUT ETHLN 3-0 FS118IN 663H

## (undated) DEVICE — BNDG ELAS CO-FLEX SLF ADHR 4IN5YD LF STRL

## (undated) DEVICE — TBG PENCL TELESCP MEGADYNE SMOKE EVAC 10FT

## (undated) DEVICE — BLD CUT FORMLA AGGR PLS 4.0MM

## (undated) DEVICE — IMMOB KN 3PNL DLX CANVS 19IN BLU

## (undated) DEVICE — NDL SPINE 22G 31/2IN BLK

## (undated) DEVICE — APPL CHLORAPREP HI/LITE 26ML ORNG

## (undated) DEVICE — TBG PUMP ARTHSCP MAIN AR6400 16FT

## (undated) DEVICE — SYS COLD/THRP POLAR/CARE/CUBE

## (undated) DEVICE — DRSNG WND GZ CURAD OIL EMULSION 3X8IN LF STRL 1PK

## (undated) DEVICE — HOLDER: Brand: DEROYAL

## (undated) DEVICE — PAD WRAP/ON KN COOL/THERP W/ELAS/STRAP LF

## (undated) DEVICE — NDL HYPO ECLPS SFTY 22G 1 1/2IN

## (undated) DEVICE — DISPOSABLE TOURNIQUET CUFF SINGLE BLADDER, SINGLE PORT AND LUER LOCK CONNECTOR: Brand: COLOR CUFF

## (undated) DEVICE — GLV SURG PREMIERPRO MIC LTX PF SZ8 BRN

## (undated) DEVICE — PK KN ARTHSCP 70

## (undated) DEVICE — NDL HYPO ECLPS SFTY 18G 1 1/2IN

## (undated) DEVICE — PATIENT RETURN ELECTRODE, SINGLE-USE, CONTACT QUALITY MONITORING, ADULT, WITH 9FT CORD, FOR PATIENTS WEIGING OVER 33LBS. (15KG): Brand: MEGADYNE